# Patient Record
Sex: MALE | Race: NATIVE HAWAIIAN OR OTHER PACIFIC ISLANDER | NOT HISPANIC OR LATINO | Employment: FULL TIME | URBAN - NONMETROPOLITAN AREA
[De-identification: names, ages, dates, MRNs, and addresses within clinical notes are randomized per-mention and may not be internally consistent; named-entity substitution may affect disease eponyms.]

---

## 2017-03-10 ENCOUNTER — OFFICE VISIT (OUTPATIENT)
Dept: URGENT CARE | Facility: PHYSICIAN GROUP | Age: 42
End: 2017-03-10
Payer: COMMERCIAL

## 2017-03-10 VITALS
RESPIRATION RATE: 16 BRPM | TEMPERATURE: 98.4 F | BODY MASS INDEX: 39.25 KG/M2 | SYSTOLIC BLOOD PRESSURE: 120 MMHG | WEIGHT: 259 LBS | HEIGHT: 68 IN | OXYGEN SATURATION: 96 % | HEART RATE: 92 BPM | DIASTOLIC BLOOD PRESSURE: 76 MMHG

## 2017-03-10 DIAGNOSIS — R05.9 COUGH: ICD-10-CM

## 2017-03-10 DIAGNOSIS — E03.8 OTHER SPECIFIED HYPOTHYROIDISM: ICD-10-CM

## 2017-03-10 PROCEDURE — 99214 OFFICE O/P EST MOD 30 MIN: CPT | Performed by: FAMILY MEDICINE

## 2017-03-10 RX ORDER — DOXYCYCLINE HYCLATE 100 MG
100 TABLET ORAL 2 TIMES DAILY
Qty: 20 TAB | Refills: 0 | Status: SHIPPED | OUTPATIENT
Start: 2017-03-10 | End: 2017-03-20

## 2017-03-10 RX ORDER — LEVOTHYROXINE SODIUM 0.07 MG/1
75 TABLET ORAL
Qty: 90 TAB | Refills: 0 | Status: SHIPPED | OUTPATIENT
Start: 2017-03-10 | End: 2018-12-21 | Stop reason: SDUPTHER

## 2017-03-10 ASSESSMENT — ENCOUNTER SYMPTOMS
WEIGHT LOSS: 0
MYALGIAS: 1
EYE DISCHARGE: 0
HEADACHES: 0
COUGH: 1
EYE REDNESS: 0

## 2017-03-10 NOTE — MR AVS SNAPSHOT
"        Sammy MOORE Ciatobin   3/10/2017 6:35 PM   Office Visit   MRN: [id number]    Department:  [facility]   Dept Phone:  [phone]    Description:  Male : 1975   Provider:  Preston Montalvo M.D.           Reason for Visit     Cough chest congestion X 2 weeks, worse in the last week      Allergies as of 3/10/2017     No Known Allergies      You were diagnosed with     Cough   [786.2.ICD-9-CM]       Other specified hypothyroidism   [2471693]         Vital Signs     Blood Pressure Pulse Temperature Respirations Height Weight    120/76 mmHg 92 36.9 °C (98.4 °F) 16 1.715 m (5' 7.5\") 117.482 kg (259 lb)    Body Mass Index Oxygen Saturation Smoking Status             39.94 kg/m2 96% Former Smoker         Basic Information     Date Of Birth Sex Race Ethnicity Preferred Language    1975 Male  Unknown English      Problem List              ICD-10-CM Priority Class Noted - Resolved    History of Hodgkin's disease Z85.71   2014 - Present    Hypothyroidism E03.9   2014 - Present    Fatigue R53.83   2014 - Present    Vitamin D deficiency disease E55.9   2014 - Present    Hypertriglyceridemia E78.1   2014 - Present    Hypersomnolence G47.10   2015 - Present    Pain of left heel M79.672   2015 - Present    Left ear pain H92.02   2015 - Present      Health Maintenance        Date Due Completion Dates    IMM DTaP/Tdap/Td Vaccine (1 - Tdap) 1996 ---    IMM INFLUENZA (1) 2016 ---            Current Immunizations     No immunizations on file.      Below and/or attached are the medications your provider expects you to take. Review all of your home medications and newly ordered medications with your provider and/or pharmacist. Follow medication instructions as directed by your provider and/or pharmacist. Please keep your medication list with you and share with your provider. Update the information when medications are discontinued, doses are changed, or new medications " (including over-the-counter products) are added; and carry medication information at all times in the event of emergency situations     Allergies:  No Known Allergies          Medications  Valid as of: March 10, 2017 -  7:05 PM    Generic Name Brand Name Tablet Size Instructions for use    Doxycycline Hyclate (Tab) VIBRAMYCIN 100 MG Take 1 Tab by mouth 2 times a day for 10 days.        Ergocalciferol (Cap) DRISDOL 66123 UNIT Take 1 Cap by mouth every 7 days.        Ergocalciferol (Cap) DRISDOL 22279 UNIT Take 1 Cap by mouth every 7 days.        Hydrocod Polst-Chlorphen Polst (Suspension Extended Release) TUSSIONEX 10-8 MG/5ML Take 5 mL by mouth every 12 hours.        Levothyroxine Sodium (Tab) SYNTHROID 75 MCG Take 1 Tab by mouth every day.        Levothyroxine Sodium (Tab) SYNTHROID 75 MCG Take 1 Tab by mouth Every morning on an empty stomach.        .                 Medicines prescribed today were sent to:     [facility] [address] [place] [address] [address] AT [place] [address] [address]    [address] [address] [facility] [address]    Phone: [phone] Fax: [phone]    Open 24 Hours?: No      Medication refill instructions:       If your prescription bottle indicates you have medication refills left, it is not necessary to call your provider’s office. Please contact your pharmacy and they will refill your medication.    If your prescription bottle indicates you do not have any refills left, you may request refills at any time through one of the following ways: The online Textbroker system (except Urgent Care), by calling your provider’s office, or by asking your pharmacy to contact your provider’s office with a refill request. Medication refills are processed only during regular business hours and may not be available until the next business day. Your provider may request additional information or to have a follow-up visit with you prior to refilling your medication.   *Please Note:  Medication refills are assigned a new Rx number when refilled electronically. Your pharmacy may indicate that no refills were authorized even though a new prescription for the same medication is available at the pharmacy. Please request the medicine by name with the pharmacy before contacting your provider for a refill.           DigiSynd Access Code: [email or web address]  Expires: 4/9/2017  7:05 PM    DigiSynd  A secure, online tool to manage your health information     [employer]’s DigiSynd® is a secure, online tool that connects you to your personalized health information from the privacy of your home -- day or night - making it very easy for you to manage your healthcare. Once the activation process is completed, you can even access your medical information using the DigiSynd raymundo, which is available for free in the Apple Raymundo store or Google Play store.     DigiSynd provides the following levels of access (as shown below):   My Chart Features   [facility] Primary Care Doctor [facility]  Specialists [facility]  Urgent  Care Non-[facility]  Primary Care  Doctor   Email your healthcare team securely and privately 24/7 X X X    Manage appointments: schedule your next appointment; view details of past/upcoming appointments X      Request prescription refills. X      View recent personal medical records, including lab and immunizations X X X X   View health record, including health history, allergies, medications X X X X   Read reports about your outpatient visits, procedures, consult and ER notes X X X X   See your discharge summary, which is a recap of your hospital and/or ER visit that includes your diagnosis, lab results, and care plan. X X       How to register for DigiSynd:  1. Go to  [email or web address].  2. Click on the Sign Up Now box, which takes you to the New Member Sign Up page. You will need to provide the following information:  a. Enter your DigiSynd Access Code exactly as it appears at the top of this page. (You will  not need to use this code after you’ve completed the sign-up process. If you do not sign up before the expiration date, you must request a new code.)   b. Enter your date of birth.   c. Enter your home email address.   d. Click Submit, and follow the next screen’s instructions.  3. Create a Mind on Gamest ID. This will be your Mind on Gamest login ID and cannot be changed, so think of one that is secure and easy to remember.  4. Create a [employer] password. You can change your password at any time.  5. Enter your Password Reset Question and Answer. This can be used at a later time if you forget your password.   6. Enter your e-mail address. This allows you to receive e-mail notifications when new information is available in [employer].  7. Click Sign Up. You can now view your health information.    For assistance activating your [employer] account, call [phone]

## 2017-03-11 NOTE — PROGRESS NOTES
"Subjective:      Sammy May is a 41 y.o. male who presents with Cough            Cough  This is a new problem. Episode onset: 2 weeks productive without blood in sputum. No fever. Initial nasal congestion and drainage resolved. No SOB/wheeze. No PMH asthma. PMH pneumonia as child. OTC nyquil and delsym with min relief.  Associated symptoms include myalgias. Pertinent negatives include no eye redness, headaches, rash or weight loss.   Worse with laying down. No other aggravating or alleviating factors.     #2   Requests refill synthroid. Has been stable on current dose.    Review of Systems   Constitutional: Positive for malaise/fatigue. Negative for weight loss.   Eyes: Negative for discharge and redness.   Respiratory: Positive for cough.    Musculoskeletal: Positive for myalgias. Negative for joint pain.   Skin: Negative for itching and rash.   Neurological: Negative for headaches.     .  Medications, Allergies, and current problem list reviewed today in Epic       Objective:     /76 mmHg  Pulse 92  Temp(Src) 36.9 °C (98.4 °F)  Resp 16  Ht 1.715 m (5' 7.5\")  Wt 117.482 kg (259 lb)  BMI 39.94 kg/m2  SpO2 96%     Physical Exam   Constitutional: He is oriented to person, place, and time. He appears well-developed and well-nourished. No distress.   HENT:   Head: Normocephalic and atraumatic.   Right Ear: External ear normal.   Left Ear: External ear normal.   Eyes: Conjunctivae are normal.   Neck: Neck supple.   Cardiovascular: Normal rate, regular rhythm and normal heart sounds.    Pulmonary/Chest: Effort normal and breath sounds normal.   Lymphadenopathy:     He has no cervical adenopathy.   Neurological: He is alert and oriented to person, place, and time.   Skin: Skin is warm and dry. No rash noted.               Assessment/Plan:     1. Cough  doxycycline (VIBRAMYCIN) 100 MG Tab    Hydrocod Polst-CPM Polst ER (TUSSIONEX) 10-8 MG/5ML Suspension Extended Release   2. Other specified hypothyroidism  " levothyroxine (SYNTHROID) 75 MCG Tab     Differential diagnosis, natural history, supportive care, and indications for immediate follow-up discussed at length.   Contingent antibiotic prescription given to patient to fill upon meeting criteria of guidelines discussed.

## 2018-02-14 ENCOUNTER — APPOINTMENT (OUTPATIENT)
Dept: RADIOLOGY | Facility: MEDICAL CENTER | Age: 43
End: 2018-02-14
Attending: EMERGENCY MEDICINE
Payer: COMMERCIAL

## 2018-02-14 ENCOUNTER — HOSPITAL ENCOUNTER (EMERGENCY)
Facility: MEDICAL CENTER | Age: 43
End: 2018-02-14
Attending: EMERGENCY MEDICINE
Payer: COMMERCIAL

## 2018-02-14 VITALS
BODY MASS INDEX: 36.7 KG/M2 | TEMPERATURE: 98.3 F | HEART RATE: 74 BPM | OXYGEN SATURATION: 96 % | HEIGHT: 69 IN | RESPIRATION RATE: 16 BRPM | DIASTOLIC BLOOD PRESSURE: 70 MMHG | SYSTOLIC BLOOD PRESSURE: 118 MMHG | WEIGHT: 247.8 LBS

## 2018-02-14 DIAGNOSIS — H65.03 BILATERAL ACUTE SEROUS OTITIS MEDIA, RECURRENCE NOT SPECIFIED: ICD-10-CM

## 2018-02-14 DIAGNOSIS — J40 BRONCHITIS: ICD-10-CM

## 2018-02-14 DIAGNOSIS — J02.9 PHARYNGITIS, UNSPECIFIED ETIOLOGY: ICD-10-CM

## 2018-02-14 LAB
FLUAV RNA SPEC QL NAA+PROBE: NEGATIVE
FLUBV RNA SPEC QL NAA+PROBE: NEGATIVE
S PYO AG THROAT QL: NORMAL
SIGNIFICANT IND 70042: NORMAL
SITE SITE: NORMAL
SOURCE SOURCE: NORMAL

## 2018-02-14 PROCEDURE — 87502 INFLUENZA DNA AMP PROBE: CPT

## 2018-02-14 PROCEDURE — 87081 CULTURE SCREEN ONLY: CPT

## 2018-02-14 PROCEDURE — 99284 EMERGENCY DEPT VISIT MOD MDM: CPT

## 2018-02-14 PROCEDURE — 87880 STREP A ASSAY W/OPTIC: CPT

## 2018-02-14 PROCEDURE — 71046 X-RAY EXAM CHEST 2 VIEWS: CPT

## 2018-02-14 PROCEDURE — A9270 NON-COVERED ITEM OR SERVICE: HCPCS | Performed by: EMERGENCY MEDICINE

## 2018-02-14 PROCEDURE — 700102 HCHG RX REV CODE 250 W/ 637 OVERRIDE(OP): Performed by: EMERGENCY MEDICINE

## 2018-02-14 RX ORDER — AMOXICILLIN AND CLAVULANATE POTASSIUM 875; 125 MG/1; MG/1
1 TABLET, FILM COATED ORAL 2 TIMES DAILY
Qty: 20 TAB | Refills: 0 | Status: SHIPPED | OUTPATIENT
Start: 2018-02-14 | End: 2018-12-21

## 2018-02-14 RX ORDER — LEVOTHYROXINE SODIUM 0.05 MG/1
50 TABLET ORAL
Qty: 30 TAB | Refills: 1 | Status: SHIPPED | OUTPATIENT
Start: 2018-02-14 | End: 2018-12-21

## 2018-02-14 RX ORDER — PROMETHAZINE HYDROCHLORIDE AND CODEINE PHOSPHATE 6.25; 1 MG/5ML; MG/5ML
5-10 SYRUP ORAL 4 TIMES DAILY PRN
Qty: 300 ML | Refills: 0 | Status: SHIPPED | OUTPATIENT
Start: 2018-02-14 | End: 2018-02-21

## 2018-02-14 RX ORDER — AMOXICILLIN AND CLAVULANATE POTASSIUM 875; 125 MG/1; MG/1
1 TABLET, FILM COATED ORAL ONCE
Status: COMPLETED | OUTPATIENT
Start: 2018-02-14 | End: 2018-02-14

## 2018-02-14 RX ADMIN — AMOXICILLIN AND CLAVULANATE POTASSIUM 1 TABLET: 875; 125 TABLET, FILM COATED ORAL at 18:15

## 2018-02-14 ASSESSMENT — LIFESTYLE VARIABLES: DO YOU DRINK ALCOHOL: YES

## 2018-02-14 NOTE — ED TRIAGE NOTES
Chief Complaint   Patient presents with   • Congestion     productive cough greenish/mucus   • Nausea

## 2018-02-15 NOTE — ED TRIAGE NOTES
.  Chief Complaint   Patient presents with   • Congestion     productive cough greenish/mucus   • Nausea   Onset X 3 days.  No fever/chills. No chest pain.  Pt reports children are also sick.

## 2018-02-15 NOTE — ED PROVIDER NOTES
"ED Provider Note    CHIEF COMPLAINT  Chief Complaint   Patient presents with   • Congestion     productive cough greenish/mucus   • Nausea       HPI  Sammy May is a 42 y.o. male who presents to emergency today with complaints of productive cough of green phlegm for the past 2 days, sore throat, congestion. No vomiting has slight diarrhea times no fever chills chest pain or shortness of breath. He states his kids have been sick with similar symptoms.    REVIEW OF SYSTEMS  See HPI for further details. All other systems are negative.      PAST MEDICAL HISTORY  Past Medical History:   Diagnosis Date   • ED (erectile dysfunction)    • Hodgkin disease (CMS-HCC)     age 21, treated at [facility]   • Hypothyroidism        FAMILY HISTORY  Family History   Problem Relation Age of Onset   • Other Father      etoh   • Cancer Paternal Grandmother      lung   • Stroke Paternal Grandfather        SOCIAL HISTORY  Social History     Social History   • Marital status: Unknown     Spouse name: N/A   • Number of children: N/A   • Years of education: N/A     Social History Main Topics   • Smoking status: Former Smoker     Packs/day: 1.50     Years: 7.00     Types: Cigarettes     Quit date: 5/8/2011   • Smokeless tobacco: Never Used   • Alcohol use No   • Drug use: No      Comment: hx drug use, no IV; mostly MJ   • Sexual activity: Yes     Partners: Female     Other Topics Concern   • Not on file     Social History Narrative   • No narrative on file       SURGICAL HISTORY  No past surgical history on file.    CURRENT MEDICATIONS  Home Medications    **Home medications have not yet been reviewed for this encounter**         ALLERGIES  No Known Allergies    PHYSICAL EXAM  VITAL SIGNS: /65   Pulse 77   Temp 36.3 °C (97.3 °F)   Resp 18   Ht 1.753 m (5' 9\")   Wt 112.4 kg (247 lb 12.8 oz)   SpO2 96%   BMI 36.59 kg/m²  Room air O2: 96    Constitutional :  Well developed, Well nourished, No acute distress, Non-toxic appearance. "   HENT: Pharynx and no injection TMs are erythematous left greater than right.   Eyes: perrla  Neck: Normal range of motion, No tenderness, Supple, No stridor.   Lymphatic: Left submandibular lymphadenopathy.   Cardiovascular: Normal heart rate, Normal rhythm, No murmurs, No rubs, No gallops.   Thorax & Lungs: Clear to auscultation all fields  Skin: Warm, Dry, No erythema, No rash.   Extremities: Intact distal pulses, No edema, No tenderness, No cyanosis, No clubbing.       RADIOLOGY/PROCEDURES  DX-CHEST-2 VIEWS   Final Result      No active disease.            COURSE & MEDICAL DECISION MAKING  Pertinent Labs & Imaging studies reviewed. (See chart for details)  Patient has otitis bilaterally placed on Augmentin. His rapid strep was negative influenza negative chest x-ray shows no acute process. He bronchitis. He is placed on Phenergan with codeine cough syrup. Controlled substance regulations through [facility] were reviewed with patient. He is not to sell the medication, give to others, he is to keep it in a safe spot away from children. Dispose of it properly. Try alternatives 1st like NyQuil ,OTC cough syrup, not to drive or operate heavy machinery medication.'s he verbalized understanding of these instructions follow up with his primary care physician given referral to Dr. Sarabia. Consent forms signed. [facility] reviewed. 's history of thyroid disease and had requested refill of his medication which was given. Synthroid 50 µg.    FINAL IMPRESSION  1. Acute pharyngitis  2. Bronchitis/bilateral otitis media  3.      Electronically signed by: Mark Sykes, 2/14/2018

## 2018-02-15 NOTE — DISCHARGE INSTRUCTIONS
Bronchitis  Bronchitis is a problem of the air tubes leading to your lungs. This problem makes it hard for air to get in and out of the lungs. You may cough a lot because your air tubes are narrow. Going without care can cause lasting (chronic) bronchitis.  HOME CARE   · Drink enough fluids to keep your pee (urine) clear or pale yellow.  · Use a cool mist humidifier.  · Quit smoking if you smoke. If you keep smoking, the bronchitis might not get better.  · Only take medicine as told by your doctor.  GET HELP RIGHT AWAY IF:   · Coughing keeps you awake.  · You start to wheeze.  · You become more sick or weak.  · You have a hard time breathing or get short of breath.  · You cough up blood.  · Coughing lasts more than 2 weeks.  · You have a fever.  · Your baby is older than 3 months with a rectal temperature of 102° F (38.9° C) or higher.  · Your baby is 3 months old or younger with a rectal temperature of 100.4° F (38° C) or higher.  MAKE SURE YOU:  · Understand these instructions.  · Will watch your condition.  · Will get help right away if you are not doing well or get worse.  Document Released: 06/05/2009 Document Revised: 03/11/2013 Document Reviewed: 11/19/2010  [employer]® Patient Information ©2014 [employer].      Pharyngitis  Pharyngitis is a sore throat (pharynx). There is redness, pain, and swelling of your throat.  HOME CARE   · Drink enough fluids to keep your pee (urine) clear or pale yellow.  · Only take medicine as told by your doctor.  ¨ You may get sick again if you do not take medicine as told. Finish your medicines, even if you start to feel better.  ¨ Do not take aspirin.  · Rest.  · Rinse your mouth (gargle) with salt water (½ tsp of salt per 1 qt of water) every 1-2 hours. This will help the pain.  · If you are not at risk for choking, you can suck on hard candy or sore throat lozenges.  GET HELP IF:  · You have large, tender lumps on your neck.  · You have a rash.  · You cough up green,  yellow-brown, or bloody spit.  GET HELP RIGHT AWAY IF:   · You have a stiff neck.  · You drool or cannot swallow liquids.  · You throw up (vomit) or are not able to keep medicine or liquids down.  · You have very bad pain that does not go away with medicine.  · You have problems breathing (not from a stuffy nose).  MAKE SURE YOU:   · Understand these instructions.  · Will watch your condition.  · Will get help right away if you are not doing well or get worse.     This information is not intended to replace advice given to you by your health care provider. Make sure you discuss any questions you have with your health care provider.     Document Released: 06/05/2009 Document Revised: 10/08/2014 Document Reviewed: 08/25/2014  Elsevier Interactive Patient Education ©2016 Elsevier Inc.

## 2018-02-16 LAB
S PYO SPEC QL CULT: NORMAL
SIGNIFICANT IND 70042: NORMAL
SITE SITE: NORMAL
SOURCE SOURCE: NORMAL

## 2018-10-18 ENCOUNTER — OFFICE VISIT (OUTPATIENT)
Dept: MEDICAL GROUP | Facility: PHYSICIAN GROUP | Age: 43
End: 2018-10-18
Payer: COMMERCIAL

## 2018-10-18 VITALS
BODY MASS INDEX: 35.4 KG/M2 | RESPIRATION RATE: 96 BRPM | HEIGHT: 69 IN | DIASTOLIC BLOOD PRESSURE: 64 MMHG | WEIGHT: 239 LBS | HEART RATE: 92 BPM | SYSTOLIC BLOOD PRESSURE: 122 MMHG | TEMPERATURE: 97.5 F

## 2018-10-18 DIAGNOSIS — Z23 NEED FOR VACCINATION: ICD-10-CM

## 2018-10-18 DIAGNOSIS — R53.83 OTHER FATIGUE: ICD-10-CM

## 2018-10-18 DIAGNOSIS — E78.1 HYPERTRIGLYCERIDEMIA: ICD-10-CM

## 2018-10-18 DIAGNOSIS — E03.8 OTHER SPECIFIED HYPOTHYROIDISM: ICD-10-CM

## 2018-10-18 DIAGNOSIS — Z72.51 HIGH RISK HETEROSEXUAL BEHAVIOR: ICD-10-CM

## 2018-10-18 DIAGNOSIS — N52.1 ERECTILE DYSFUNCTION DUE TO DISEASES CLASSIFIED ELSEWHERE: ICD-10-CM

## 2018-10-18 DIAGNOSIS — Z85.71 HISTORY OF HODGKIN'S DISEASE: ICD-10-CM

## 2018-10-18 PROBLEM — N52.9 ERECTILE DYSFUNCTION: Status: ACTIVE | Noted: 2018-10-18

## 2018-10-18 PROCEDURE — 90471 IMMUNIZATION ADMIN: CPT | Performed by: FAMILY MEDICINE

## 2018-10-18 PROCEDURE — 90686 IIV4 VACC NO PRSV 0.5 ML IM: CPT | Performed by: FAMILY MEDICINE

## 2018-10-18 PROCEDURE — 99204 OFFICE O/P NEW MOD 45 MIN: CPT | Mod: 25 | Performed by: FAMILY MEDICINE

## 2018-10-18 RX ORDER — LEVOTHYROXINE SODIUM 0.07 MG/1
75 TABLET ORAL
Qty: 30 TAB | Refills: 11 | Status: SHIPPED | OUTPATIENT
Start: 2018-10-18 | End: 2018-12-21

## 2018-10-18 ASSESSMENT — PATIENT HEALTH QUESTIONNAIRE - PHQ9: CLINICAL INTERPRETATION OF PHQ2 SCORE: 0

## 2018-10-18 ASSESSMENT — PAIN SCALES - GENERAL: PAINLEVEL: NO PAIN

## 2018-10-18 NOTE — ASSESSMENT & PLAN NOTE
Chronic uncontrolled medical condition.  States that he cannot achieve a full erection and will often lose his erect status in the middle of colitis.  He states that this was better when he was treated for his hypothyroidism.

## 2018-10-18 NOTE — ASSESSMENT & PLAN NOTE
Chronic unstable medical condition.  Patient has noticed fatigue and weight gain and has been off of his levothyroxine for 5 months now.  Prior to his 5-month hiatus from the levothyroxine he was noncompliant with his medications.  And frequently forgot his dose.

## 2018-10-18 NOTE — PROGRESS NOTES
CC:  Diagnoses of Other specified hypothyroidism, Other fatigue, Hypertriglyceridemia, Need for vaccination, High risk heterosexual behavior, Erectile dysfunction due to diseases classified elsewhere, and History of Hodgkin's disease were pertinent to this visit.    HISTORY OF THE PRESENT ILLNESS: Patient is a 43 y.o. male. This pleasant patient is here today to establish with a new primary care provider.  Last seen by Liliya gamboa in February 2015 and one urgent care visit in 2017 for a cough.    Health Maintenance: Completed      Hypothyroidism  Chronic unstable medical condition.  Patient has noticed fatigue and weight gain and has been off of his levothyroxine for 5 months now.  Prior to his 5-month hiatus from the levothyroxine he was noncompliant with his medications.  And frequently forgot his dose.    Fatigue  Likely secondary to his untreated hypothyroidism.  We will reevaluate in 2 months after he has restarted his medications and we retested his TSH level.    Erectile dysfunction  Chronic uncontrolled medical condition.  States that he cannot achieve a full erection and will often lose his erect status in the middle of colitis.  He states that this was better when he was treated for his hypothyroidism.    High risk heterosexual behavior  Long history of sexual promiscuity that is currently better as he is in a monogamous relationship right now but has never been tested for STDs.  States that he has never had any lesions or discharge or pain, but would still like to get checked out.    Hypertriglyceridemia  Chronic and stable medical condition.  Last lab check demonstrated hypertriglyceridemia and low HDL.  We will perform follow-up labs.    History of Hodgkin's disease  Chronic stable medical condition.  Currently in full remission.    Allergies: Patient has no known allergies.    Current Outpatient Prescriptions Ordered in [facility]   Medication Sig Dispense Refill   • levothyroxine (SYNTHROID) 75 MCG Tab  Take 1 Tab by mouth Every morning on an empty stomach. 30 Tab 11   • levothyroxine (SYNTHROID) 75 MCG Tab Take 1 Tab by mouth Every morning on an empty stomach. 90 Tab 0   • levothyroxine (SYNTHROID) 50 MCG Tab Take 1 Tab by mouth Every morning on an empty stomach. 30 Tab 1   • amoxicillin-clavulanate (AUGMENTIN) 875-125 MG Tab Take 1 Tab by mouth 2 times a day. 20 Tab 0   • Hydrocod Polst-CPM Polst ER (TUSSIONEX) 10-8 MG/5ML Suspension Extended Release Take 5 mL by mouth every 12 hours. 120 mL 0   • levothyroxine (SYNTHROID) 75 MCG TABS Take 1 Tab by mouth every day. 90 Tab 3   • ergocalciferol (DRISDOL) 16942 UNIT capsule Take 1 Cap by mouth every 7 days. 12 Cap 0   • ergocalciferol (DRISDOL) 22194 UNIT capsule Take 1 Cap by mouth every 7 days. 12 Cap 0     No current Epic-ordered facility-administered medications on file.        Past Medical History:   Diagnosis Date   • ED (erectile dysfunction)    • Hodgkin disease (HCC)     age 21, treated at [facility]   • Hypothyroidism        Past Surgical History:   Procedure Laterality Date   • LYMPH NODE EXCISION Right 1997   • DENTAL EXTRACTION(S)      wisdom teeth       Social History   Substance Use Topics   • Smoking status: Former Smoker     Packs/day: 1.50     Years: 7.00     Types: Cigarettes     Quit date: 5/8/2011   • Smokeless tobacco: Never Used   • Alcohol use No       Social History     Social History Narrative   • No narrative on file       Family History   Problem Relation Age of Onset   • Other Father         etoh   • Alcohol/Drug Father    • Diabetes Father    • Lung Disease Father         smoker on O2   • Cancer Mother    • Cancer Paternal Grandmother         lung   • Stroke Paternal Grandfather        ROS:   Constitutional: No Fevers, Chills, positive weight gain  Eyes: No eye pain  ENT: No sore throat  Resp: No Shortness of breath  CV: No Chest pain  GI: No Nausea/Vomiting  MSK: No weakness  Skin: No rashes  Neuro: No Headaches  Psych: No Suicidal  "ideations        Exam: Blood pressure 122/64, pulse 92, temperature 36.4 °C (97.5 °F), resp. rate (!) 96, height 1.753 m (5' 9\"), weight 108.4 kg (239 lb). Body mass index is 35.29 kg/m².    GENERAL: No acute distress  HENT: Atraumatic, normocephalic, external auditory canals clear bilaterally, TMs translucent and pearly gray, nares clear without discharge, posterior pharynx clear without erythema or exudates.  EYES: Extraocular movements intact, pupils equal and reactive to light.  NECK: Supple, FROM  CHEST: No deformities, Equal chest expansion, no murmurs, gallops, or rubs.  RESP: Unlabored, no stridor or audible wheeze.  No wheezes, crackles, nor rhonchi  ABD: Soft, Nontender  Extremities: No Clubbing, Cyanosis, or Edema.  Skin: Warm/dry, without rases  Neuro: A/O x 4, CN 2-12 Grossly intact, Motor/sensory grosly intact  Psych: Normal behavior, normal affect        Lab review:  orders written for new lab studies as appropriate; see orders    Medical Records Reviewed:  Medical records from February 2015 and urgent care visit from March 2017 reviewed      Assessment/Plan:  1. Other specified hypothyroidism  Chronic uncontrolled medical condition.  Patient has been noncompliant with his medicine for 5 months.  Sent new prescription of levothyroxine to his pharmacy and labs as below.  Follow-up in 2 months.  - TSH WITH REFLEX TO FT4; Future  - CBC WITH DIFFERENTIAL; Future  - COMP METABOLIC PANEL; Future  - levothyroxine (SYNTHROID) 75 MCG Tab; Take 1 Tab by mouth Every morning on an empty stomach.  Dispense: 30 Tab; Refill: 11    2. Other fatigue  Chronic uncontrolled medical condition.  Probably related to his hypothyroidism.  Labs as below.  Follow-up in 2 months after treated for his hypothyroidism to see if this improves.  - TSH WITH REFLEX TO FT4; Future  - CBC WITH DIFFERENTIAL; Future  - COMP METABOLIC PANEL; Future    3. Hypertriglyceridemia  Chronic uncontrolled medical condition.  Previous history of " hypertriglyceridemia and low HDL.  Labs as below.  Follow-up in 2 months  - TSH WITH REFLEX TO FT4; Future  - CBC WITH DIFFERENTIAL; Future  - COMP METABOLIC PANEL; Future  - LIPID PROFILE; Future    4. Need for vaccination  - Influenza Vaccine Quad Injection >3Y (PF)    5. High risk heterosexual behavior  History of high promise daily and never been tested currently better now that he is in a monogamous relationship.  Labs as below.  Follow-up in 2 months.  - HIV AG/AB COMBO ASSAY SCREENING; Future  - T.PALLIDUM AB EIA; Future  - CHLAMYDIA/GC PCR URINE OR SWAB; Future    6. Erectile dysfunction due to diseases classified elsewhere  Chronic uncontrolled medical condition.  We will treat for his hypothyroidism and see if this improves.  Reevaluate in 2 months.    7. History of Hodgkin's disease  Chronic controlled medical condition.  Currently in full remission.    Follow-up in 2 months.      Please note that this dictation was created using voice recognition software. I have made every reasonable attempt to correct obvious errors, but I expect that there are errors of grammar and possibly content that I did not discover before finalizing the note.

## 2018-10-18 NOTE — ASSESSMENT & PLAN NOTE
Long history of sexual promiscuity that is currently better as he is in a monogamous relationship right now but has never been tested for STDs.  States that he has never had any lesions or discharge or pain, but would still like to get checked out.

## 2018-10-18 NOTE — ASSESSMENT & PLAN NOTE
Chronic and stable medical condition.  Last lab check demonstrated hypertriglyceridemia and low HDL.  We will perform follow-up labs.

## 2018-10-18 NOTE — ASSESSMENT & PLAN NOTE
Likely secondary to his untreated hypothyroidism.  We will reevaluate in 2 months after he has restarted his medications and we retested his TSH level.

## 2018-12-07 ENCOUNTER — HOSPITAL ENCOUNTER (OUTPATIENT)
Dept: LAB | Facility: MEDICAL CENTER | Age: 43
End: 2018-12-07
Attending: FAMILY MEDICINE
Payer: COMMERCIAL

## 2018-12-07 DIAGNOSIS — E78.1 HYPERTRIGLYCERIDEMIA: ICD-10-CM

## 2018-12-07 DIAGNOSIS — Z72.51 HIGH RISK HETEROSEXUAL BEHAVIOR: ICD-10-CM

## 2018-12-07 DIAGNOSIS — R53.83 OTHER FATIGUE: ICD-10-CM

## 2018-12-07 DIAGNOSIS — E03.8 OTHER SPECIFIED HYPOTHYROIDISM: ICD-10-CM

## 2018-12-07 LAB
ALBUMIN SERPL BCP-MCNC: 4.4 G/DL (ref 3.2–4.9)
ALBUMIN/GLOB SERPL: 1.3 G/DL
ALP SERPL-CCNC: 65 U/L (ref 30–99)
ALT SERPL-CCNC: 36 U/L (ref 2–50)
ANION GAP SERPL CALC-SCNC: 11 MMOL/L (ref 0–11.9)
AST SERPL-CCNC: 29 U/L (ref 12–45)
BASOPHILS # BLD AUTO: 0.9 % (ref 0–1.8)
BASOPHILS # BLD: 0.08 K/UL (ref 0–0.12)
BILIRUB SERPL-MCNC: 0.7 MG/DL (ref 0.1–1.5)
BUN SERPL-MCNC: 15 MG/DL (ref 8–22)
C TRACH DNA SPEC QL NAA+PROBE: NEGATIVE
CALCIUM SERPL-MCNC: 8.9 MG/DL (ref 8.5–10.5)
CHLORIDE SERPL-SCNC: 103 MMOL/L (ref 96–112)
CHOLEST SERPL-MCNC: 226 MG/DL (ref 100–199)
CO2 SERPL-SCNC: 24 MMOL/L (ref 20–33)
CREAT SERPL-MCNC: 0.91 MG/DL (ref 0.5–1.4)
EOSINOPHIL # BLD AUTO: 0.23 K/UL (ref 0–0.51)
EOSINOPHIL NFR BLD: 2.7 % (ref 0–6.9)
ERYTHROCYTE [DISTWIDTH] IN BLOOD BY AUTOMATED COUNT: 40.5 FL (ref 35.9–50)
GLOBULIN SER CALC-MCNC: 3.3 G/DL (ref 1.9–3.5)
GLUCOSE SERPL-MCNC: 108 MG/DL (ref 65–99)
HCT VFR BLD AUTO: 52.5 % (ref 42–52)
HDLC SERPL-MCNC: 32 MG/DL
HGB BLD-MCNC: 17.1 G/DL (ref 14–18)
HIV 1+2 AB+HIV1 P24 AG SERPL QL IA: NON REACTIVE
IMM GRANULOCYTES # BLD AUTO: 0.07 K/UL (ref 0–0.11)
IMM GRANULOCYTES NFR BLD AUTO: 0.8 % (ref 0–0.9)
LDLC SERPL CALC-MCNC: ABNORMAL MG/DL
LYMPHOCYTES # BLD AUTO: 1.26 K/UL (ref 1–4.8)
LYMPHOCYTES NFR BLD: 14.9 % (ref 22–41)
MCH RBC QN AUTO: 28.4 PG (ref 27–33)
MCHC RBC AUTO-ENTMCNC: 32.6 G/DL (ref 33.7–35.3)
MCV RBC AUTO: 87.2 FL (ref 81.4–97.8)
MONOCYTES # BLD AUTO: 0.66 K/UL (ref 0–0.85)
MONOCYTES NFR BLD AUTO: 7.8 % (ref 0–13.4)
N GONORRHOEA DNA SPEC QL NAA+PROBE: NEGATIVE
NEUTROPHILS # BLD AUTO: 6.18 K/UL (ref 1.82–7.42)
NEUTROPHILS NFR BLD: 72.9 % (ref 44–72)
NRBC # BLD AUTO: 0 K/UL
NRBC BLD-RTO: 0 /100 WBC
PLATELET # BLD AUTO: 249 K/UL (ref 164–446)
PMV BLD AUTO: 11.4 FL (ref 9–12.9)
POTASSIUM SERPL-SCNC: 3.7 MMOL/L (ref 3.6–5.5)
PROT SERPL-MCNC: 7.7 G/DL (ref 6–8.2)
RBC # BLD AUTO: 6.02 M/UL (ref 4.7–6.1)
SODIUM SERPL-SCNC: 138 MMOL/L (ref 135–145)
SPECIMEN SOURCE: NORMAL
TREPONEMA PALLIDUM IGG+IGM AB [PRESENCE] IN SERUM OR PLASMA BY IMMUNOASSAY: NON REACTIVE
TRIGL SERPL-MCNC: 424 MG/DL (ref 0–149)
TSH SERPL DL<=0.005 MIU/L-ACNC: 2.29 UIU/ML (ref 0.38–5.33)
WBC # BLD AUTO: 8.5 K/UL (ref 4.8–10.8)

## 2018-12-07 PROCEDURE — 87389 HIV-1 AG W/HIV-1&-2 AB AG IA: CPT

## 2018-12-07 PROCEDURE — 80061 LIPID PANEL: CPT

## 2018-12-07 PROCEDURE — 87591 N.GONORRHOEAE DNA AMP PROB: CPT

## 2018-12-07 PROCEDURE — 87491 CHLMYD TRACH DNA AMP PROBE: CPT

## 2018-12-07 PROCEDURE — 85025 COMPLETE CBC W/AUTO DIFF WBC: CPT

## 2018-12-07 PROCEDURE — 84443 ASSAY THYROID STIM HORMONE: CPT

## 2018-12-07 PROCEDURE — 86780 TREPONEMA PALLIDUM: CPT

## 2018-12-07 PROCEDURE — 36415 COLL VENOUS BLD VENIPUNCTURE: CPT

## 2018-12-07 PROCEDURE — 80053 COMPREHEN METABOLIC PANEL: CPT

## 2018-12-10 ENCOUNTER — TELEPHONE (OUTPATIENT)
Dept: MEDICAL GROUP | Facility: PHYSICIAN GROUP | Age: 43
End: 2018-12-10

## 2018-12-10 NOTE — TELEPHONE ENCOUNTER
----- Message from Juan A Lamb M.D. sent at 12/10/2018  1:46 PM PST -----  Please advise patient that lab results were normal with the exception of his cholesterol panel.  We can discuss treatment options at his follow-up appointment on 12/21/2018.

## 2018-12-10 NOTE — TELEPHONE ENCOUNTER
Phone Number Called: [phone] (home)       Message: let pt know Dr will discuss lab results at his upcoming appt    Left Message for patient to call back: no

## 2018-12-21 ENCOUNTER — OFFICE VISIT (OUTPATIENT)
Dept: MEDICAL GROUP | Facility: PHYSICIAN GROUP | Age: 43
End: 2018-12-21
Payer: COMMERCIAL

## 2018-12-21 VITALS
SYSTOLIC BLOOD PRESSURE: 120 MMHG | HEART RATE: 81 BPM | DIASTOLIC BLOOD PRESSURE: 82 MMHG | BODY MASS INDEX: 35.25 KG/M2 | TEMPERATURE: 98.5 F | HEIGHT: 69 IN | OXYGEN SATURATION: 96 % | WEIGHT: 238 LBS

## 2018-12-21 DIAGNOSIS — E03.8 OTHER SPECIFIED HYPOTHYROIDISM: ICD-10-CM

## 2018-12-21 DIAGNOSIS — N52.1 ERECTILE DYSFUNCTION DUE TO DISEASES CLASSIFIED ELSEWHERE: ICD-10-CM

## 2018-12-21 DIAGNOSIS — E78.1 HYPERTRIGLYCERIDEMIA: ICD-10-CM

## 2018-12-21 PROCEDURE — 99214 OFFICE O/P EST MOD 30 MIN: CPT | Performed by: FAMILY MEDICINE

## 2018-12-21 RX ORDER — LEVOTHYROXINE SODIUM 0.07 MG/1
75 TABLET ORAL
Qty: 90 TAB | Refills: 3 | Status: SHIPPED | OUTPATIENT
Start: 2018-12-21 | End: 2019-12-03 | Stop reason: SDUPTHER

## 2018-12-21 RX ORDER — ATORVASTATIN CALCIUM 20 MG/1
20 TABLET, FILM COATED ORAL DAILY
Qty: 90 TAB | Refills: 3 | Status: SHIPPED | OUTPATIENT
Start: 2018-12-21 | End: 2020-03-06 | Stop reason: SDUPTHER

## 2018-12-21 RX ORDER — SILDENAFIL 50 MG/1
50 TABLET, FILM COATED ORAL PRN
Qty: 30 TAB | Refills: 3 | Status: SHIPPED | OUTPATIENT
Start: 2018-12-21

## 2018-12-21 ASSESSMENT — PAIN SCALES - GENERAL: PAINLEVEL: NO PAIN

## 2018-12-21 NOTE — ASSESSMENT & PLAN NOTE
Chronic ongoing medical condition.  Patient with elevated triglycerides in the 400s.  Strong family history of stroke and heart disease.  ASCVD score 2.7%, however I do not feel that this accurately reflects his cardiac risk due to the fact that it does not take into account his family history nor his triglycerides.  No heart attack or stroke to date.

## 2018-12-21 NOTE — ASSESSMENT & PLAN NOTE
Chronic ongoing medical condition.  Possibly secondary to physiologic comorbidities such as hypertriglyceridemia and hypothyroidism.  Hypothyroidism currently well controlled on 75 mcg daily.

## 2018-12-21 NOTE — PROGRESS NOTES
CC:Diagnoses of Hypertriglyceridemia, Erectile dysfunction due to diseases classified elsewhere, and Other specified hypothyroidism were pertinent to this visit.      HISTORY OF PRESENT ILLNESS: Patient is a 43 y.o. male established patient who presents today to follow-up on triglycerides, hypothyroidism, and erectile dysfunction.    Health Maintenance: Completed    Erectile dysfunction  Chronic ongoing medical condition.  Possibly secondary to physiologic comorbidities such as hypertriglyceridemia and hypothyroidism.  Hypothyroidism currently well controlled on 75 mcg daily.    Hypertriglyceridemia  Chronic ongoing medical condition.  Patient with elevated triglycerides in the 400s.  Strong family history of stroke and heart disease.  ASCVD score 2.7%, however I do not feel that this accurately reflects his cardiac risk due to the fact that it does not take into account his family history nor his triglycerides.  No heart attack or stroke to date.    Hypothyroidism  Chronic stable medical condition.  Currently well controlled on levothyroxine 75 mcg daily.      Patient Active Problem List    Diagnosis Date Noted   • High risk heterosexual behavior 10/18/2018   • Erectile dysfunction 10/18/2018   • Hypersomnolence 02/17/2015   • Hypertriglyceridemia 06/17/2014   • Vitamin D deficiency disease 06/16/2014   • History of Hodgkin's disease 05/08/2014   • Hypothyroidism 05/08/2014   • Fatigue 05/08/2014      Allergies:Patient has no known allergies.    Current Outpatient Prescriptions   Medication Sig Dispense Refill   • atorvastatin (LIPITOR) 20 MG Tab Take 1 Tab by mouth every day. 90 Tab 3   • levothyroxine (SYNTHROID) 75 MCG Tab Take 1 Tab by mouth Every morning on an empty stomach. 90 Tab 3   • sildenafil citrate (VIAGRA) 50 MG tablet Take 1 Tab by mouth as needed for Erectile Dysfunction. 30 Tab 3     No current facility-administered medications for this visit.        Social History   Substance Use Topics   •  "Smoking status: Former Smoker     Packs/day: 1.50     Years: 7.00     Types: Cigarettes     Quit date: 5/8/2011   • Smokeless tobacco: Never Used   • Alcohol use No     Social History     Social History Narrative   • No narrative on file       Family History   Problem Relation Age of Onset   • Other Father         etoh   • Alcohol/Drug Father    • Diabetes Father    • Lung Disease Father         smoker on O2   • Cancer Mother    • Cancer Paternal Grandmother         lung   • Stroke Paternal Grandfather        Review of Systems:    Denies chest pain and shortness of breath    Exam:    Blood pressure 120/82, pulse 81, temperature 36.9 °C (98.5 °F), height 1.753 m (5' 9\"), weight 108 kg (238 lb), SpO2 96 %. Body mass index is 35.15 kg/m².    GENERAL: No acute distress  HENT: Atraumatic, normocephalic  EYES: Extraocular movements intact, pupils equal and reactive to light  NECK: Supple, FROM  CHEST: No deformities, Equal chest expansion  RESP: Unlabored, no stridor or audible wheeze  ABD: Soft, Nontender, Non-Distended  Extremities: No Clubbing, Cyanosis, or Edema  Skin: Warm/dry, without rases  Neuro: A/O x 4, CN 2-12 Grossly intact, Motor/sensory grosly intact  Psych: Normal behavior, normal affect    LABS: Cholesterol and thyroid panel: Results reviewed and discussed with the patient, questions answered.    Assessment/Plan:  1. Hypertriglyceridemia  Chronic ongoing medical condition. New prescription for atorvastatin as below.  Labs as below for 2-3 months from now.  Follow-up pending labs.  - atorvastatin (LIPITOR) 20 MG Tab; Take 1 Tab by mouth every day.  Dispense: 90 Tab; Refill: 3  - COMP METABOLIC PANEL; Future  - Lipid Profile; Future    2. Erectile dysfunction due to diseases classified elsewhere  Chronic ongoing medical condition.  Viagra as below.  - sildenafil citrate (VIAGRA) 50 MG tablet; Take 1 Tab by mouth as needed for Erectile Dysfunction.  Dispense: 30 Tab; Refill: 3    3. Other specified " hypothyroidism  Chronic stable medical condition.  Please continue levothyroxine at 75 mcg daily.  - levothyroxine (SYNTHROID) 75 MCG Tab; Take 1 Tab by mouth Every morning on an empty stomach.  Dispense: 90 Tab; Refill: 3    Follow-up pending labs or in 1 year.

## 2019-07-12 ENCOUNTER — OFFICE VISIT (OUTPATIENT)
Dept: MEDICAL GROUP | Facility: PHYSICIAN GROUP | Age: 44
End: 2019-07-12
Payer: COMMERCIAL

## 2019-07-12 VITALS
HEART RATE: 91 BPM | WEIGHT: 244 LBS | SYSTOLIC BLOOD PRESSURE: 122 MMHG | TEMPERATURE: 98.4 F | HEIGHT: 69 IN | BODY MASS INDEX: 36.14 KG/M2 | OXYGEN SATURATION: 95 % | DIASTOLIC BLOOD PRESSURE: 80 MMHG

## 2019-07-12 DIAGNOSIS — E03.8 OTHER SPECIFIED HYPOTHYROIDISM: ICD-10-CM

## 2019-07-12 DIAGNOSIS — E66.9 OBESITY (BMI 30-39.9): ICD-10-CM

## 2019-07-12 DIAGNOSIS — Z23 NEED FOR VACCINATION: ICD-10-CM

## 2019-07-12 DIAGNOSIS — E78.1 HYPERTRIGLYCERIDEMIA: ICD-10-CM

## 2019-07-12 DIAGNOSIS — E78.5 DYSLIPIDEMIA (HIGH LDL; LOW HDL): ICD-10-CM

## 2019-07-12 DIAGNOSIS — N52.1 ERECTILE DYSFUNCTION DUE TO DISEASES CLASSIFIED ELSEWHERE: ICD-10-CM

## 2019-07-12 PROCEDURE — 99214 OFFICE O/P EST MOD 30 MIN: CPT | Mod: 25 | Performed by: FAMILY MEDICINE

## 2019-07-12 PROCEDURE — 90471 IMMUNIZATION ADMIN: CPT | Performed by: FAMILY MEDICINE

## 2019-07-12 PROCEDURE — 90715 TDAP VACCINE 7 YRS/> IM: CPT | Performed by: FAMILY MEDICINE

## 2019-07-12 ASSESSMENT — PATIENT HEALTH QUESTIONNAIRE - PHQ9: CLINICAL INTERPRETATION OF PHQ2 SCORE: 0

## 2019-07-12 ASSESSMENT — PAIN SCALES - GENERAL: PAINLEVEL: NO PAIN

## 2019-07-12 NOTE — ASSESSMENT & PLAN NOTE
Chronic ongoing medical condition.  Counseled on diet, exercise, and lifestyle changes to help with weight loss.

## 2019-07-12 NOTE — PROGRESS NOTES
CC:Diagnoses of Need for vaccination, Erectile dysfunction due to diseases classified elsewhere, Hypertriglyceridemia, Dyslipidemia (high LDL; low HDL), and Other specified hypothyroidism were pertinent to this visit.      HISTORY OF PRESENT ILLNESS: Patient is a 44 y.o. male established patient who presents today to follow-up on multiple medical conditions.    Health Maintenance: Completed    Erectile dysfunction  Chronic ongoing medical condition.  Currently well controlled with sildenafil.  Counseled on dietary control of triglycerides and hypothyroidism and how this may help with his erectile dysfunction.    Hypertriglyceridemia  Chronic ongoing medical condition.  Counseled on getting his repeat labs completed and dietary changes that will help lower his cholesterol levels as well as his triglycerides.    Dyslipidemia (high LDL; low HDL)  Chronic ongoing medical condition.  Currently well controlled with atorvastatin 20 mg daily.    Hypothyroidism  Chronic ongoing medical condition.  Currently well controlled on levothyroxine 75 mcg daily.      Patient Active Problem List    Diagnosis Date Noted   • Dyslipidemia (high LDL; low HDL) 07/12/2019   • High risk heterosexual behavior 10/18/2018   • Erectile dysfunction 10/18/2018   • Hypersomnolence 02/17/2015   • Hypertriglyceridemia 06/17/2014   • Vitamin D deficiency disease 06/16/2014   • History of Hodgkin's disease 05/08/2014   • Hypothyroidism 05/08/2014   • Fatigue 05/08/2014      Allergies:Patient has no known allergies.    Current Outpatient Prescriptions   Medication Sig Dispense Refill   • atorvastatin (LIPITOR) 20 MG Tab Take 1 Tab by mouth every day. 90 Tab 3   • levothyroxine (SYNTHROID) 75 MCG Tab Take 1 Tab by mouth Every morning on an empty stomach. 90 Tab 3   • sildenafil citrate (VIAGRA) 50 MG tablet Take 1 Tab by mouth as needed for Erectile Dysfunction. 30 Tab 3     No current facility-administered medications for this visit.        Social  "History   Substance Use Topics   • Smoking status: Former Smoker     Packs/day: 1.50     Years: 7.00     Types: Cigarettes     Quit date: 5/8/2011   • Smokeless tobacco: Never Used   • Alcohol use No     Social History     Social History Narrative   • No narrative on file       Family History   Problem Relation Age of Onset   • Other Father         etoh   • Alcohol/Drug Father    • Diabetes Father    • Lung Disease Father         smoker on O2   • Cancer Mother    • Cancer Paternal Grandmother         lung   • Stroke Paternal Grandfather        Review of Systems:   Constitutional: No Fevers, Chills  Eyes: No vision changes  ENT: No hearing changes  Resp: No Shortness of breath  CV: No Chest pain  GI: No Nausea/Vomiting  MSK: No weakness  Skin: No rashes  Neuro: No Headaches  Psych: No Suicidal ideations    All remaining systems reviewed and found to be negative, except as stated above.    Exam:    /80 (BP Location: Left arm, Patient Position: Sitting, BP Cuff Size: Adult long)   Pulse 91   Temp 36.9 °C (98.4 °F)   Ht 1.753 m (5' 9\")   Wt 110.7 kg (244 lb)   SpO2 95%  Body mass index is 36.03 kg/m².    General:  Well nourished, well developed male in NAD  HENT: Atraumatic, normocephalic  EYES: Extraocular movements intact, pupils equal and reactive to light  NECK: Supple, FROM  CHEST: No deformities, Equal chest expansion  RESP: Unlabored, no stridor or audible wheeze  ABD: Soft, Nontender, Non-Distended  Extremities: No Clubbing, Cyanosis, or Edema  Skin: Warm/dry, without rashes  Neuro: A/O x 4, CN 2-12 Grossly intact, Motor/sensory grossly intact  Psych: Normal behavior, normal affect        Assessment/Plan:  1. Need for vaccination  - Tdap Vaccine =>6YO IM    2. Erectile dysfunction due to diseases classified elsewhere  Chronic ongoing medical condition continue as needed sildenafil    3. Hypertriglyceridemia  Chronic ongoing medical condition.  Please complete previously ordered metabolic panel and " lipids.  Counseled on dietary changes to help improve triglyceride levels.    4. Dyslipidemia (high LDL; low HDL)  Chronic ongoing medical condition.  Continue atorvastatin 20 mg daily.    5. Other specified hypothyroidism  Chronic ongoing medical condition.  Continue levothyroxine 75 mcg daily.  No changes indicated at this time peer      Follow-up in 1 year or sooner as needed.    Please note that this dictation was created using voice recognition software. I have worked with consultants from the vendor as well as technical experts from [employer] to optimize the interface. I have made every reasonable attempt to correct obvious errors, but I expect that there are errors of grammar and possibly content that I did not discover before finalizing the note.

## 2019-07-12 NOTE — ASSESSMENT & PLAN NOTE
Chronic ongoing medical condition.  Counseled on getting his repeat labs completed and dietary changes that will help lower his cholesterol levels as well as his triglycerides.

## 2019-07-12 NOTE — ASSESSMENT & PLAN NOTE
Chronic ongoing medical condition.  Currently well controlled with sildenafil.  Counseled on dietary control of triglycerides and hypothyroidism and how this may help with his erectile dysfunction.

## 2019-10-24 ENCOUNTER — OFFICE VISIT (OUTPATIENT)
Dept: MEDICAL GROUP | Facility: PHYSICIAN GROUP | Age: 44
End: 2019-10-24
Payer: COMMERCIAL

## 2019-10-24 VITALS
BODY MASS INDEX: 35.99 KG/M2 | DIASTOLIC BLOOD PRESSURE: 68 MMHG | TEMPERATURE: 97.8 F | SYSTOLIC BLOOD PRESSURE: 106 MMHG | HEIGHT: 69 IN | WEIGHT: 243 LBS | OXYGEN SATURATION: 95 % | HEART RATE: 100 BPM

## 2019-10-24 DIAGNOSIS — Z23 NEED FOR VACCINATION: ICD-10-CM

## 2019-10-24 DIAGNOSIS — M94.0 COSTOCHONDRITIS, ACUTE: ICD-10-CM

## 2019-10-24 PROCEDURE — 90686 IIV4 VACC NO PRSV 0.5 ML IM: CPT | Performed by: FAMILY MEDICINE

## 2019-10-24 PROCEDURE — 99213 OFFICE O/P EST LOW 20 MIN: CPT | Mod: 25 | Performed by: FAMILY MEDICINE

## 2019-10-24 PROCEDURE — 90471 IMMUNIZATION ADMIN: CPT | Performed by: FAMILY MEDICINE

## 2019-10-24 ASSESSMENT — ENCOUNTER SYMPTOMS
DIARRHEA: 0
FEVER: 0
SHORTNESS OF BREATH: 0
VOMITING: 0
PALPITATIONS: 0
WHEEZING: 0
ORTHOPNEA: 0
CHILLS: 0
CLAUDICATION: 0
WEIGHT LOSS: 0
NAUSEA: 0
HEADACHES: 0
DIAPHORESIS: 0
SPUTUM PRODUCTION: 0
PND: 0
DEPRESSION: 0
ABDOMINAL PAIN: 0
COUGH: 1

## 2019-10-24 ASSESSMENT — LIFESTYLE VARIABLES: SUBSTANCE_ABUSE: 0

## 2019-10-24 ASSESSMENT — PAIN SCALES - GENERAL: PAINLEVEL: NO PAIN

## 2019-10-24 NOTE — PROGRESS NOTES
Subjective:      Sammy May is a 44 y.o. male who presents with Chest Pain            This is a relatively healthy 44-year-old male who presents for acute sharp chest pain after coughing this morning.  Patient states that the tenderness is pinpoint and at worst 8/10 in severity.  Does not radiate.  Does not seem to be exacerbated by activity or alleviated by rest.  Denies any shortness of breath, diaphoresis, nausea, vomiting associated with this.  States that he will often wake up cough once or twice and get a sharp twinge of pain in the front of his chest however yesterday after a big heavy cough he got the same sharp twinge of pain but it lingered for the remainder of the day however pain is currently resolved.  Patient has not tried any over-the-counter medications or remedies.      Review of Systems   Constitutional: Negative for chills, diaphoresis, fever, malaise/fatigue and weight loss.   Respiratory: Positive for cough (Throat clearing). Negative for sputum production, shortness of breath and wheezing.    Cardiovascular: Positive for chest pain. Negative for palpitations, orthopnea, claudication, leg swelling and PND.   Gastrointestinal: Negative for abdominal pain, diarrhea, nausea and vomiting.   Neurological: Negative for headaches.   Psychiatric/Behavioral: Negative for depression, substance abuse and suicidal ideas.   All other systems reviewed and are negative.    I reviewed the following    Past Medical History:   Diagnosis Date   • ED (erectile dysfunction)    • Hodgkin disease (HCC)     age 21, treated at [facility]   • Hyperlipidemia    • Hypothyroidism         Past Surgical History:   Procedure Laterality Date   • LYMPH NODE EXCISION Right 1997   • DENTAL EXTRACTION(S)      wisdom teeth       No Known Allergies    Current Outpatient Medications   Medication Sig Dispense Refill   • atorvastatin (LIPITOR) 20 MG Tab Take 1 Tab by mouth every day. 90 Tab 3   • levothyroxine (SYNTHROID) 75 MCG Tab  Take 1 Tab by mouth Every morning on an empty stomach. 90 Tab 3   • sildenafil citrate (VIAGRA) 50 MG tablet Take 1 Tab by mouth as needed for Erectile Dysfunction. 30 Tab 3     No current facility-administered medications for this visit.         Family History   Problem Relation Age of Onset   • Other Father         etoh   • Alcohol/Drug Father    • Diabetes Father    • Lung Disease Father         smoker on O2   • Cancer Mother    • Cancer Paternal Grandmother         lung   • Stroke Paternal Grandfather        Social History     Socioeconomic History   • Marital status: Unknown     Spouse name: Not on file   • Number of children: Not on file   • Years of education: Not on file   • Highest education level: Not on file   Occupational History   • Not on file   Social Needs   • Financial resource strain: Not on file   • Food insecurity:     Worry: Not on file     Inability: Not on file   • Transportation needs:     Medical: Not on file     Non-medical: Not on file   Tobacco Use   • Smoking status: Former Smoker     Packs/day: 1.50     Years: 7.00     Pack years: 10.50     Types: Cigarettes     Last attempt to quit: 2011     Years since quittin.4   • Smokeless tobacco: Never Used   Substance and Sexual Activity   • Alcohol use: No   • Drug use: No     Comment: hx drug use, no IV; mostly MJ   • Sexual activity: Yes     Partners: Female     Birth control/protection: Coitus Interrupt[employer], IUD   Lifestyle   • Physical activity:     Days per week: Not on file     Minutes per session: Not on file   • Stress: Not on file   Relationships   • Social connections:     Talks on phone: Not on file     Gets together: Not on file     Attends Zoroastrian service: Not on file     Active member of club or organization: Not on file     Attends meetings of clubs or organizations: Not on file     Relationship status: Not on file   • Intimate partner violence:     Fear of current or ex partner: Not on file     Emotionally abused: Not on  "file     Physically abused: Not on file     Forced sexual activity: Not on file   Other Topics Concern   • Not on file   Social History Narrative   • Not on file         Objective:     /68 (BP Location: Left arm, Patient Position: Sitting, BP Cuff Size: Adult)   Pulse 100   Temp 36.6 °C (97.8 °F)   Ht 1.753 m (5' 9\")   Wt 110.2 kg (243 lb)   SpO2 95%   BMI 35.88 kg/m²      Physical Exam   Constitutional: He is oriented to person, place, and time. He appears well-developed and well-nourished.   HENT:   Head: Normocephalic and atraumatic.   Eyes: Pupils are equal, round, and reactive to light. EOM are normal.   Neck: Normal range of motion. Neck supple.   Cardiovascular: Normal rate, normal heart sounds and intact distal pulses. Exam reveals no gallop and no friction rub.   No murmur heard.  Pulmonary/Chest: Effort normal and breath sounds normal. No stridor. No respiratory distress. He has no wheezes. He has no rales. He exhibits tenderness (Pinpoint reproducible chest pain at approximately the T6 articulation with the sternum).   Abdominal: He exhibits no distension.   Musculoskeletal: Normal range of motion.   Neurological: He is alert and oriented to person, place, and time. No cranial nerve deficit.   Skin: Skin is warm and dry.   Psychiatric: He has a normal mood and affect. His behavior is normal.        Assessment/Plan:     1. Need for vaccination  - Influenza Vaccine Quad Injection (PF)    2. Costochondritis, acute  New problem to examiner.  Counseled on OTC ibuprofen and follow-up if recurring or worse or not improving.    Follow-up PRN    Please note that this dictation was created using voice recognition software. I have worked with consultants from the vendor as well as technical experts from [employer] to optimize the interface. I have made every reasonable attempt to correct obvious errors, but I expect that there are errors of grammar and possibly content that I did not discover before " finalizing the note.

## 2019-11-01 ENCOUNTER — OFFICE VISIT (OUTPATIENT)
Dept: MEDICAL GROUP | Facility: PHYSICIAN GROUP | Age: 44
End: 2019-11-01
Payer: COMMERCIAL

## 2019-11-01 VITALS
WEIGHT: 242 LBS | OXYGEN SATURATION: 98 % | HEIGHT: 69 IN | TEMPERATURE: 97.7 F | BODY MASS INDEX: 35.84 KG/M2 | SYSTOLIC BLOOD PRESSURE: 108 MMHG | HEART RATE: 91 BPM | DIASTOLIC BLOOD PRESSURE: 80 MMHG

## 2019-11-01 DIAGNOSIS — M54.81 OCCIPITAL NEURALGIA OF RIGHT SIDE: ICD-10-CM

## 2019-11-01 PROCEDURE — 99213 OFFICE O/P EST LOW 20 MIN: CPT | Performed by: FAMILY MEDICINE

## 2019-11-01 ASSESSMENT — ENCOUNTER SYMPTOMS
CHILLS: 0
FOCAL WEAKNESS: 0
HEADACHES: 1
WEAKNESS: 0
DIZZINESS: 0
MYALGIAS: 0
VOMITING: 0
BLURRED VISION: 0
SORE THROAT: 0
NAUSEA: 0
ABDOMINAL PAIN: 0
SENSORY CHANGE: 0
EYE PAIN: 0
WEIGHT LOSS: 0
DEPRESSION: 0
SHORTNESS OF BREATH: 0
PHOTOPHOBIA: 0
SINUS PAIN: 0
SPEECH CHANGE: 0
COUGH: 1
DOUBLE VISION: 0
BACK PAIN: 0
FEVER: 0
NECK PAIN: 1

## 2019-11-01 ASSESSMENT — LIFESTYLE VARIABLES: SUBSTANCE_ABUSE: 0

## 2019-11-01 ASSESSMENT — PAIN SCALES - GENERAL: PAINLEVEL: 10=SEVERE PAIN

## 2019-11-01 NOTE — PROGRESS NOTES
Subjective:      Sammy May is a 44 y.o. male who presents with Head Pain            This is an otherwise healthy 44-year-old male who presents for 6 days of sharp stabbing headache radiating from the occiput anteriorly on the right side of his head.  States that it is worse with coughing and certain positions.  Pain progressively worsened for 3 days since onset 6 days ago and has since been progressively improving over the last 3 days.  Denies any photophobia, phonophobia, nausea, vomiting.  Denies any lacrimation.  Denies any thunderclap onset.  Pain 5/10 in severity.  Nothing seems to make it better or worse.  Denies any visual acuity changes, weakness, fevers or chills.      Review of Systems   Constitutional: Negative for chills, fever, malaise/fatigue and weight loss.   HENT: Negative for congestion, ear pain, sinus pain and sore throat.    Eyes: Negative for blurred vision, double vision, photophobia and pain.   Respiratory: Positive for cough. Negative for shortness of breath.    Cardiovascular: Negative for chest pain.   Gastrointestinal: Negative for abdominal pain, nausea and vomiting.   Musculoskeletal: Positive for neck pain (Sore muscles). Negative for back pain, joint pain and myalgias.   Neurological: Positive for headaches. Negative for dizziness, sensory change, speech change, focal weakness and weakness.   Endo/Heme/Allergies: Negative for environmental allergies.   Psychiatric/Behavioral: Negative for depression, substance abuse and suicidal ideas.     I reviewed the following    Past Medical History:   Diagnosis Date   • ED (erectile dysfunction)    • Hodgkin disease (HCC)     age 21, treated at [facility]   • Hyperlipidemia    • Hypothyroidism         Past Surgical History:   Procedure Laterality Date   • LYMPH NODE EXCISION Right 1997   • DENTAL EXTRACTION(S)      wisdom teeth       No Known Allergies    Current Outpatient Medications   Medication Sig Dispense Refill   • atorvastatin  (LIPITOR) 20 MG Tab Take 1 Tab by mouth every day. 90 Tab 3   • levothyroxine (SYNTHROID) 75 MCG Tab Take 1 Tab by mouth Every morning on an empty stomach. 90 Tab 3   • sildenafil citrate (VIAGRA) 50 MG tablet Take 1 Tab by mouth as needed for Erectile Dysfunction. 30 Tab 3     No current facility-administered medications for this visit.         Family History   Problem Relation Age of Onset   • Other Father         etoh   • Alcohol/Drug Father    • Diabetes Father    • Lung Disease Father         smoker on O2   • Cancer Mother    • Cancer Paternal Grandmother         lung   • Stroke Paternal Grandfather        Social History     Socioeconomic History   • Marital status: Unknown     Spouse name: Not on file   • Number of children: Not on file   • Years of education: Not on file   • Highest education level: Not on file   Occupational History   • Not on file   Social Needs   • Financial resource strain: Not on file   • Food insecurity:     Worry: Not on file     Inability: Not on file   • Transportation needs:     Medical: Not on file     Non-medical: Not on file   Tobacco Use   • Smoking status: Former Smoker     Packs/day: 1.50     Years: 7.00     Pack years: 10.50     Types: Cigarettes     Last attempt to quit: 2011     Years since quittin.4   • Smokeless tobacco: Never Used   Substance and Sexual Activity   • Alcohol use: No   • Drug use: No     Comment: hx drug use, no IV; mostly MJ   • Sexual activity: Yes     Partners: Female     Birth control/protection: Coitus Interruptus, IUD   Lifestyle   • Physical activity:     Days per week: Not on file     Minutes per session: Not on file   • Stress: Not on file   Relationships   • Social connections:     Talks on phone: Not on file     Gets together: Not on file     Attends [employer] service: Not on file     Active member of club or organization: Not on file     Attends meetings of clubs or organizations: Not on file     Relationship status: Not on file   •  "Intimate partner violence:     Fear of current or ex partner: Not on file     Emotionally abused: Not on file     Physically abused: Not on file     Forced sexual activity: Not on file   Other Topics Concern   • Not on file   Social History Narrative   • Not on file           Objective:     /80 (BP Location: Left arm, Patient Position: Sitting, BP Cuff Size: Adult)   Pulse 91   Temp 36.5 °C (97.7 °F)   Ht 1.753 m (5' 9\")   Wt 109.8 kg (242 lb)   SpO2 98%   BMI 35.74 kg/m²      Physical Exam   Constitutional: He is oriented to person, place, and time. He appears well-developed and well-nourished.   HENT:   Head: Normocephalic and atraumatic.   Eyes: Pupils are equal, round, and reactive to light. EOM are normal.   Neck: Normal range of motion. Neck supple. No JVD present. No tracheal deviation present. No thyromegaly present.   Cardiovascular: Normal rate.   Pulmonary/Chest: Effort normal. No respiratory distress.   Abdominal: He exhibits no distension.   Musculoskeletal: Normal range of motion.   Lymphadenopathy:     He has no cervical adenopathy.   Neurological: He is alert and oriented to person, place, and time.   Skin: Skin is warm and dry.   Psychiatric: He has a normal mood and affect. His behavior is normal.        Assessment/Plan:     1. Occipital neuralgia of right side  New problem to examiner.  Counseled on massage therapy, stretching, OTC NSAIDs.  Handout on cervical spine home therapy provided.  Counseled on follow-up if not improving or worsening.    Follow-up PRN    Please note that this dictation was created using voice recognition software. I have worked with consultants from the vendor as well as technical experts from [employer] to optimize the interface. I have made every reasonable attempt to correct obvious errors, but I expect that there are errors of grammar and possibly content that I did not discover before finalizing the note.      "

## 2019-11-11 ENCOUNTER — OFFICE VISIT (OUTPATIENT)
Dept: URGENT CARE | Facility: PHYSICIAN GROUP | Age: 44
End: 2019-11-11
Payer: COMMERCIAL

## 2019-11-11 VITALS
RESPIRATION RATE: 20 BRPM | OXYGEN SATURATION: 95 % | DIASTOLIC BLOOD PRESSURE: 68 MMHG | TEMPERATURE: 100.1 F | HEART RATE: 116 BPM | WEIGHT: 246 LBS | SYSTOLIC BLOOD PRESSURE: 106 MMHG | BODY MASS INDEX: 36.43 KG/M2 | HEIGHT: 69 IN

## 2019-11-11 DIAGNOSIS — J11.1 INFLUENZA WITH UPPER RESPIRATORY SYMPTOMS: ICD-10-CM

## 2019-11-11 DIAGNOSIS — J10.1 INFLUENZA A: ICD-10-CM

## 2019-11-11 LAB
FLUAV+FLUBV AG SPEC QL IA: POSITIVE
INT CON NEG: NEGATIVE
INT CON POS: POSITIVE

## 2019-11-11 PROCEDURE — 87804 INFLUENZA ASSAY W/OPTIC: CPT | Performed by: NURSE PRACTITIONER

## 2019-11-11 PROCEDURE — 99214 OFFICE O/P EST MOD 30 MIN: CPT | Performed by: NURSE PRACTITIONER

## 2019-11-11 RX ORDER — OSELTAMIVIR PHOSPHATE 75 MG/1
75 CAPSULE ORAL 2 TIMES DAILY
Qty: 10 CAP | Refills: 0 | Status: SHIPPED | OUTPATIENT
Start: 2019-11-11 | End: 2021-03-23

## 2019-11-11 ASSESSMENT — ENCOUNTER SYMPTOMS
FOCAL WEAKNESS: 0
NAUSEA: 0
DIZZINESS: 0
SENSORY CHANGE: 0
ABDOMINAL PAIN: 0
SORE THROAT: 0
HEADACHES: 1
CONSTIPATION: 0
CHILLS: 1
DIARRHEA: 0
VOMITING: 0
FEVER: 1
COUGH: 0
BACK PAIN: 0

## 2019-11-11 ASSESSMENT — LIFESTYLE VARIABLES: SUBSTANCE_ABUSE: 0

## 2019-11-11 NOTE — LETTER
November 11, 2019       Patient: Sammy May   YOB: 1975   Date of Visit: 11/11/2019         To Whom It May Concern:    It is my medical opinion that Sammy May return to full duty, no restrictions on 11/14/19 due to medical illness.              Sincerely,          ARAVIND Garcia  Electronically Signed      Is This A New Presentation, Or A Follow-Up?: Phototherapy Treatment When Was Your Last Phototherapy Treatment?: 08/07/2019

## 2019-11-12 NOTE — PROGRESS NOTES
"Subjective:      Sammy May is a 44 y.o. male who presents with Fever (x 2 days, body aches, cough, runny nose, nasal drip)    Reviewed past medical, surgical and family history. Reviewed prescription and OTC medications with patient in electronic health record today      No Known Allergies          HPI is a new problem.  Sammy is a 44-year-old male patient presents with sudden onset of fever 2 days ago.  Associated symptoms include cough, runny nose, sore throat, body aches, fatigue, and headache.  Treatments tried been over-the-counter acetaminophen.  He has had mild relief of his symptoms with that.  He received an influenza vaccination approximately 1 and half months ago.  He has no known exposure to anyone with influenza.  No other aggravating relieving factors.    Review of Systems   Constitutional: Positive for chills and fever.   HENT: Negative for congestion and sore throat.    Respiratory: Negative for cough.    Cardiovascular: Negative for chest pain.   Gastrointestinal: Negative for abdominal pain, constipation, diarrhea, nausea and vomiting.   Musculoskeletal: Negative for back pain.   Neurological: Positive for headaches. Negative for dizziness, sensory change and focal weakness.   Psychiatric/Behavioral: Negative for substance abuse.          Objective:     /68   Pulse (!) 116   Temp 37.8 °C (100.1 °F) (Temporal)   Resp 20   Ht 1.753 m (5' 9\")   Wt 111.6 kg (246 lb)   SpO2 95%   BMI 36.33 kg/m²      Physical Exam  Vitals signs and nursing note reviewed.   Constitutional:       General: He is not in acute distress.     Appearance: Normal appearance. He is well-developed, well-groomed and overweight. He is ill-appearing. He is not toxic-appearing.   HENT:      Head: Normocephalic.      Right Ear: Hearing, ear canal and external ear normal. Tympanic membrane is injected.      Left Ear: Hearing, ear canal and external ear normal. Tympanic membrane is injected.      Nose: Mucosal edema " present.      Right Sinus: No maxillary sinus tenderness or frontal sinus tenderness.      Left Sinus: No maxillary sinus tenderness or frontal sinus tenderness.      Mouth/Throat:      Mouth: Mucous membranes are moist.      Pharynx: Uvula midline. Posterior oropharyngeal erythema present. No oropharyngeal exudate.      Tonsils: No tonsillar exudate.   Eyes:      General: Lids are normal.         Right eye: No discharge.         Left eye: No discharge.      Conjunctiva/sclera: Conjunctivae normal.      Pupils: Pupils are equal, round, and reactive to light.   Neck:      Musculoskeletal: Full passive range of motion without pain, normal range of motion and neck supple.      Trachea: Trachea and phonation normal.   Cardiovascular:      Rate and Rhythm: Normal rate and regular rhythm.      Pulses: Normal pulses.      Heart sounds: Normal heart sounds.   Pulmonary:      Effort: Pulmonary effort is normal. No respiratory distress.      Breath sounds: Normal breath sounds.   Chest:      Chest wall: No tenderness.   Abdominal:      Palpations: Abdomen is soft.      Tenderness: There is no tenderness.   Musculoskeletal: Normal range of motion.   Lymphadenopathy:      Head:      Right side of head: No submental, submandibular, tonsillar, preauricular, posterior auricular or occipital adenopathy.      Left side of head: No submental, submandibular, tonsillar, preauricular, posterior auricular or occipital adenopathy.      Cervical: No cervical adenopathy.      Upper Body:      Right upper body: No supraclavicular adenopathy.      Left upper body: No supraclavicular adenopathy.   Skin:     General: Skin is warm.      Capillary Refill: Capillary refill takes less than 2 seconds.      Findings: No rash.   Neurological:      Mental Status: He is alert and oriented to person, place, and time.   Psychiatric:         Mood and Affect: Mood normal.         Speech: Speech normal.         Behavior: Behavior normal. Behavior is  cooperative.         Thought Content: Thought content normal.            POCT influenza: positive A; negative B        Assessment/Plan:     1. Influenza A  oseltamivir (TAMIFLU) 75 MG Cap   2. Influenza with upper respiratory symptoms  POCT Influenza A/B           Educated in natural progression of disease with supportive care and symptomatic relief of symptoms. Educated in s/s that would need further evaluation and management in ER, UC or by PCP. Follow up prn for new or worsening symptoms.   Keep well hydrated- Increase rest  Treat fever > 101.5 with OTC ibuprofen or acetaminophen. Follow Dosage and safety directions of the .   Educated in infection control practices.   Do not return to work until fever free for 24 hours.     Patient was in agreement with this treatment plan and seemed to understand without barriers. All questions were encouraged and answered

## 2019-11-12 NOTE — PATIENT INSTRUCTIONS
"Influenza A (H1N1)  H1N1 formerly called \"swine flu\" is a new influenza virus causing sickness in people. The H1N1 virus is different from seasonal influenza viruses. However, the H1N1 symptoms are similar to seasonal influenza and it is spread from person to person. You may be at higher risk for serious problems if you have underlying serious medical conditions. The CDC and the World Health Organization are following reported cases around the world.  CAUSES   · The flu is thought to spread mainly person-to-person through coughing or sneezing of infected people.  · A person may become infected by touching something with the virus on it and then touching their mouth or nose.  SYMPTOMS   · Fever.  · Headache.  · Tiredness.  · Cough.  · Sore throat.  · Runny or stuffy nose.  · Body aches.  · Diarrhea and vomiting  These symptoms are referred to as \"flu-like symptoms.\" A lot of different illnesses, including the common cold, may have similar symptoms.  DIAGNOSIS   · There are tests that can tell if you have the H1N1 virus.  · Confirmed cases of H1N1 will be reported to the state or local health department.  · A doctor's exam may be needed to tell whether you have an infection that is a complication of the flu.  HOME CARE INSTRUCTIONS   · Stay informed. Visit the CDC website for current recommendations. Visit www.cdc.gov/K4R4sfr/. You may also call 9-298-CSF-INFO (1-802.345.8516).  · Get help early if you develop any of the above symptoms.  · If you are at high risk from complications of the flu, talk to your caregiver as soon as you develop flu-like symptoms. Those at higher risk for complications include:  · People 65 years or older.  · People with chronic medical conditions.  · Pregnant women.  · Young children.  · Your caregiver may recommend antiviral medicine to help treat the flu.  · If you get the flu, get plenty of rest, drink enough water and fluids to keep your urine clear or pale yellow, and avoid using " alcohol or tobacco.  · You may take over-the-counter medicine to relieve the symptoms of the flu if your caregiver approves. (Never give aspirin to children or teenagers who have flu-like symptoms, particularly fever).  TREATMENT   If you do get sick, antiviral drugs are available. These drugs can make your illness milder and make you feel better faster. Treatment should start soon after illness starts. It is only effective if taken within the first day of becoming ill. Only your caregiver can prescribe antiviral medication.   PREVENTION   · Cover your nose and mouth with a tissue or your arm when you cough or sneeze. Throw the tissue away.  · Wash your hands often with soap and warm water, especially after you cough or sneeze. Alcohol-based  are also effective against germs.  · Avoid touching your eyes, nose or mouth. This is one way germs spread.  · Try to avoid contact with sick people. Follow public health advice regarding school closures. Avoid crowds.  · Stay home if you get sick. Limit contact with others to keep from infecting them. People infected with the H1N1 virus may be able to infect others anywhere from 1 day before feeling sick to 5-7 days after getting flu symptoms.  · An H1N1 vaccine is available to help protect against the virus. In addition to the H1N1 vaccine, you will need to be vaccinated for seasonal influenza. The H1N1 and seasonal vaccines may be given on the same day. The CDC especially recommends the H1N1 vaccine for:  · Pregnant women.  · People who live with or care for children younger than 6 months of age.  · Health care and emergency services personnel.  · Persons between the ages of 6 months through 24 years of age.  · People from ages 25 through 64 years who are at higher risk for H1N1 because of chronic health disorders or immune system problems.  FACEMASKS  In community and home settings, the use of facemasks and N95 respirators are not normally recommended. In certain  circumstances, a facemask or N95 respirator may be used for persons at increased risk of severe illness from influenza. Your caregiver can give additional recommendations for facemask use.  IN CHILDREN, EMERGENCY WARNING SIGNS THAT NEED URGENT MEDICAL CARE:  · Fast breathing or trouble breathing.  · Bluish skin color.  · Not drinking enough fluids.  · Not waking up or not interacting normally.  · Being so fussy that the child does not want to be held.  · Your child has an oral temperature above 102° F (38.9° C), not controlled by medicine.  · Your baby is older than 3 months with a rectal temperature of 102° F (38.9° C) or higher.  · Your baby is 3 months old or younger with a rectal temperature of 100.4° F (38° C) or higher.  · Flu-like symptoms improve but then return with fever and worse cough.  IN ADULTS, EMERGENCY WARNING SIGNS THAT NEED URGENT MEDICAL CARE:  · Difficulty breathing or shortness of breath.  · Pain or pressure in the chest or abdomen.  · Sudden dizziness.  · Confusion.  · Severe or persistent vomiting.  · Bluish color.  · You have a oral temperature above 102° F (38.9° C), not controlled by medicine.  · Flu-like symptoms improve but return with fever and worse cough.  SEEK IMMEDIATE MEDICAL CARE IF:   You or someone you know is experiencing any of the above symptoms. When you arrive at the emergency center, report that you think you have the flu. You may be asked to wear a mask and/or sit in a secluded area to protect others from getting sick.  MAKE SURE YOU:   · Understand these instructions.  · Will watch your condition.  · Will get help right away if you are not doing well or get worse.  Some of this information courtesy of the CDC.   Document Released: 06/05/2009 Document Revised: 03/11/2013 Document Reviewed: 06/05/2009  [employer]® Patient Information ©2014 [employer].

## 2019-12-03 DIAGNOSIS — E03.8 OTHER SPECIFIED HYPOTHYROIDISM: ICD-10-CM

## 2019-12-03 RX ORDER — LEVOTHYROXINE SODIUM 0.07 MG/1
75 TABLET ORAL
Qty: 90 TAB | Refills: 4 | Status: SHIPPED | OUTPATIENT
Start: 2019-12-03 | End: 2021-02-17

## 2019-12-05 ENCOUNTER — TELEPHONE (OUTPATIENT)
Dept: URGENT CARE | Facility: PHYSICIAN GROUP | Age: 44
End: 2019-12-05

## 2020-03-05 DIAGNOSIS — E78.1 HYPERTRIGLYCERIDEMIA: ICD-10-CM

## 2020-03-05 RX ORDER — ATORVASTATIN CALCIUM 20 MG/1
20 TABLET, FILM COATED ORAL DAILY
Qty: 90 TAB | Refills: 0 | Status: CANCELLED | OUTPATIENT
Start: 2020-03-05

## 2020-03-06 DIAGNOSIS — E78.1 HYPERTRIGLYCERIDEMIA: ICD-10-CM

## 2020-03-06 RX ORDER — ATORVASTATIN CALCIUM 20 MG/1
20 TABLET, FILM COATED ORAL DAILY
Qty: 90 TAB | Refills: 4 | Status: SHIPPED | OUTPATIENT
Start: 2020-03-06 | End: 2021-05-28 | Stop reason: SDUPTHER

## 2020-03-07 NOTE — TELEPHONE ENCOUNTER
Received request via: Patient    Was the patient seen in the last year in this department? Yes    Does the patient have an active prescription (recently filled or refills available) for medication(s) requested? No     Pt called and was very upset that he was told his Rx was sent over and he has gone to the pharmacy 3 times and his Rx still has not been sent over. Pt stated that he is out of his medication and needs this filled ASAP

## 2020-12-30 ENCOUNTER — TELEPHONE (OUTPATIENT)
Dept: MEDICAL GROUP | Facility: PHYSICIAN GROUP | Age: 45
End: 2020-12-30

## 2020-12-30 NOTE — TELEPHONE ENCOUNTER
Future Appointments       Provider Department Center    1/6/2021 1:00 PM Juan A Lamb M.D. [facility] [place]        ESTABLISHED PATIENT PRE-VISIT PLANNING     Patient was NOT contacted to complete PVP.     Note: Patient will not be contacted if there is no indication to call.     1.  Reviewed notes from the last few office visits within the medical group: Yes,  11/01/2019    2.  If any orders were placed at last visit or intended to be done for this visit (i.e. 6 mos follow-up), do we have Results/Consult Notes?         •  Labs - Labs were not ordered at last office visit.  Note: If patient appointment is for lab review and patient did not complete labs, check with provider if OK to reschedule patient until labs completed.       •  Imaging - Imaging was not ordered at last office visit.       •  Referrals - No referrals were ordered at last office visit.    3. Is this appointment scheduled as a Hospital Follow-Up? No    4.  Immunizations were updated in Epic using Reconcile Outside Information activity? Yes    5.  Patient is due for the following Health Maintenance Topics:   Health Maintenance Due   Topic Date Due   • IMM INFLUENZA (1) 09/01/2020       - Patient already has appointment scheduled for Immunizations: FLU    6.  AHA (Pulse8) form printed for Provider? N/A

## 2021-01-06 ENCOUNTER — APPOINTMENT (OUTPATIENT)
Dept: MEDICAL GROUP | Facility: PHYSICIAN GROUP | Age: 46
End: 2021-01-06
Payer: COMMERCIAL

## 2021-02-17 DIAGNOSIS — E03.8 OTHER SPECIFIED HYPOTHYROIDISM: ICD-10-CM

## 2021-02-17 RX ORDER — LEVOTHYROXINE SODIUM 0.07 MG/1
TABLET ORAL
Qty: 90 TABLET | Refills: 0 | Status: SHIPPED | OUTPATIENT
Start: 2021-02-17 | End: 2021-05-28 | Stop reason: SDUPTHER

## 2021-02-17 NOTE — TELEPHONE ENCOUNTER
Received request via: Pharmacy    Was the patient seen in the last year in this department? No   Last office visit 11/2/2019    Does the patient have an active prescription (recently filled or refills available) for medication(s) requested? No

## 2021-02-18 NOTE — TELEPHONE ENCOUNTER
Phone Number Called: [phone] (home)     I spoke to the patient ad let him know that his levothyroxine had been refilled, however, per Dr Lamb, that  further refills could not be done without the patient being seen in the office.  The patient has appointments for himself and two sons on March 5, 2021 with Dr. Lamb.

## 2021-03-23 ENCOUNTER — HOSPITAL ENCOUNTER (OUTPATIENT)
Dept: RADIOLOGY | Facility: MEDICAL CENTER | Age: 46
End: 2021-03-23
Attending: FAMILY MEDICINE
Payer: COMMERCIAL

## 2021-03-23 ENCOUNTER — OFFICE VISIT (OUTPATIENT)
Dept: MEDICAL GROUP | Facility: PHYSICIAN GROUP | Age: 46
End: 2021-03-23
Payer: COMMERCIAL

## 2021-03-23 VITALS
WEIGHT: 262 LBS | TEMPERATURE: 97.3 F | HEIGHT: 69 IN | BODY MASS INDEX: 38.8 KG/M2 | HEART RATE: 88 BPM | DIASTOLIC BLOOD PRESSURE: 78 MMHG | OXYGEN SATURATION: 96 % | SYSTOLIC BLOOD PRESSURE: 120 MMHG

## 2021-03-23 DIAGNOSIS — E03.8 OTHER SPECIFIED HYPOTHYROIDISM: ICD-10-CM

## 2021-03-23 DIAGNOSIS — E66.9 OBESITY (BMI 35.0-39.9 WITHOUT COMORBIDITY): ICD-10-CM

## 2021-03-23 DIAGNOSIS — Z77.120 MOLD EXPOSURE: ICD-10-CM

## 2021-03-23 DIAGNOSIS — R06.83 SNORING: ICD-10-CM

## 2021-03-23 DIAGNOSIS — E78.5 DYSLIPIDEMIA (HIGH LDL; LOW HDL): ICD-10-CM

## 2021-03-23 DIAGNOSIS — Z23 NEED FOR VACCINATION: ICD-10-CM

## 2021-03-23 PROCEDURE — 99214 OFFICE O/P EST MOD 30 MIN: CPT | Mod: 25 | Performed by: FAMILY MEDICINE

## 2021-03-23 PROCEDURE — 90686 IIV4 VACC NO PRSV 0.5 ML IM: CPT | Performed by: FAMILY MEDICINE

## 2021-03-23 PROCEDURE — 90471 IMMUNIZATION ADMIN: CPT | Performed by: FAMILY MEDICINE

## 2021-03-23 PROCEDURE — 71046 X-RAY EXAM CHEST 2 VIEWS: CPT

## 2021-03-23 ASSESSMENT — PATIENT HEALTH QUESTIONNAIRE - PHQ9: CLINICAL INTERPRETATION OF PHQ2 SCORE: 0

## 2021-03-23 NOTE — PROGRESS NOTES
This note is formatted in an APSO format, for additional subjective and objective evaluation please scroll to the bottom of the note.    CC:Diagnoses of Need for vaccination, Snoring, Other specified hypothyroidism, Dyslipidemia (high LDL; low HDL), Obesity (BMI 35.0-39.9 without comorbidity), and Mold exposure were pertinent to this visit.    Patient presents today with concerns regarding possible black mold exposure in his bathroom.  Patient spent extensive time trying to clean and eradicate the black mold but has not sought professional help for assistance in doing so.  Patient believes that previous year of worsening allergies, cough, sinus pressure and sleep disturbances may be related to this as they have improved since he attempted to remove black mold himself.  He is also endorsed recurrent headaches which seem to be positional.  Patient states that when he rests his head back on a headrest of either his car or a high back chair that the back of his head becomes extremely tender and experiences sharp shooting pains.  He also notices that he is having increasing sleep disturbances he is waking up several times per night.  Has a history of severe snoring.  Endorses daytime fatigue and can easily fall asleep while watching TV with his sons, but denies ever falling asleep at the wheel or morning headaches.    LABS: 2018: Results reviewed and discussed with the patient, questions answered.        1. Need for vaccination  Influenza Vaccine Quad Injection (PF)   2. Snoring  REFERRAL TO PULMONARY AND SLEEP MEDICINE   3. Other specified hypothyroidism  TSH    FREE THYROXINE  Continue levothyroxine 75 mcg daily.   4. Dyslipidemia (high LDL; low HDL)  CBC WITH DIFFERENTIAL    Comp Metabolic Panel    Lipid Profile  Continue atorvastatin 20 mg daily   5. Obesity (BMI 35.0-39.9 without comorbidity)  Comp Metabolic Panel    HEMOGLOBIN A1C   6. Mold exposure  DX-CHEST-2 VIEWS         Patient Active Problem List    Diagnosis  "Date Noted   • Dyslipidemia (high LDL; low HDL) 2019   • Obesity (BMI 35.0-39.9 without comorbidity) 2019   • High risk heterosexual behavior 10/18/2018   • Erectile dysfunction 10/18/2018   • Hypersomnolence 2015   • Hypertriglyceridemia 2014   • Vitamin D deficiency disease 2014   • History of Hodgkin's disease 2014   • Hypothyroidism 2014   • Fatigue 2014      Allergies:Patient has no known allergies.    Current Outpatient Medications   Medication Sig Dispense Refill   • levothyroxine (SYNTHROID) 75 MCG Tab TAKE 1 TABLET BY MOUTH IN THE MORNING ON AN EMPTY STOMACH 90 tablet 0   • atorvastatin (LIPITOR) 20 MG Tab Take 1 Tab by mouth every day. 90 Tab 4   • sildenafil citrate (VIAGRA) 50 MG tablet Take 1 Tab by mouth as needed for Erectile Dysfunction. 30 Tab 3   • oseltamivir (TAMIFLU) 75 MG Cap Take 1 Cap by mouth 2 times a day. 10 Cap 0     No current facility-administered medications for this visit.       Social History     Tobacco Use   • Smoking status: Former Smoker     Packs/day: 1.50     Years: 7.00     Pack years: 10.50     Types: Cigarettes     Quit date: 2011     Years since quittin.8   • Smokeless tobacco: Never Used   Substance Use Topics   • Alcohol use: No   • Drug use: No     Comment: hx drug use, no IV; mostly MJ     Social History     Social History Narrative   • Not on file       Family History   Problem Relation Age of Onset   • Other Father         etoh   • Alcohol/Drug Father    • Diabetes Father    • Lung Disease Father         smoker on O2   • Cancer Mother    • Cancer Paternal Grandmother         lung   • Stroke Paternal Grandfather          Exam:    /78 (BP Location: Left arm, Patient Position: Sitting)   Pulse 88   Temp 36.3 °C (97.3 °F) (Temporal)   Ht 1.753 m (5' 9\")   Wt 119 kg (262 lb)   SpO2 96%  Body mass index is 38.69 kg/m².    General:  Well nourished, well developed male in NAD, obese secondary to increase lean " muscle mass  HENT: Atraumatic, normocephalic  EYES: Extraocular movements intact, pupils equal and reactive to light  NECK: Supple, FROM  CHEST: No deformities, Equal chest expansion  RESP: Unlabored, no stridor or audible wheeze  ABD: Non-Distended  Extremities: No Clubbing, Cyanosis, or Edema  Skin: Warm/dry, without rashes  Neuro: A/O x 4, CN 2-12 Grossly intact, Motor/sensory grossly intact  Psych: Normal behavior, normal affect    Please note that this dictation was created using voice recognition software. I have worked with consultants from the vendor as well as technical experts from [facility] to optimize the interface. I have made every reasonable attempt to correct obvious errors, but I expect that there are errors of grammar and possibly content that I did not discover before finalizing the note.

## 2021-04-07 ENCOUNTER — IMMUNIZATION (OUTPATIENT)
Dept: FAMILY PLANNING/WOMEN'S HEALTH CLINIC | Facility: IMMUNIZATION CENTER | Age: 46
End: 2021-04-07
Payer: COMMERCIAL

## 2021-04-07 DIAGNOSIS — Z23 ENCOUNTER FOR VACCINATION: Primary | ICD-10-CM

## 2021-04-07 PROCEDURE — 91300 PFIZER SARS-COV-2 VACCINE: CPT | Performed by: INTERNAL MEDICINE

## 2021-04-07 PROCEDURE — 0001A PFIZER SARS-COV-2 VACCINE: CPT | Performed by: INTERNAL MEDICINE

## 2021-04-29 ENCOUNTER — IMMUNIZATION (OUTPATIENT)
Dept: FAMILY PLANNING/WOMEN'S HEALTH CLINIC | Facility: IMMUNIZATION CENTER | Age: 46
End: 2021-04-29
Payer: COMMERCIAL

## 2021-04-29 DIAGNOSIS — Z23 ENCOUNTER FOR VACCINATION: Primary | ICD-10-CM

## 2021-04-29 PROCEDURE — 91300 PFIZER SARS-COV-2 VACCINE: CPT

## 2021-04-29 PROCEDURE — 0002A PFIZER SARS-COV-2 VACCINE: CPT

## 2021-05-22 ENCOUNTER — HOSPITAL ENCOUNTER (OUTPATIENT)
Dept: LAB | Facility: MEDICAL CENTER | Age: 46
End: 2021-05-22
Attending: FAMILY MEDICINE
Payer: COMMERCIAL

## 2021-05-22 DIAGNOSIS — E03.8 OTHER SPECIFIED HYPOTHYROIDISM: ICD-10-CM

## 2021-05-22 DIAGNOSIS — E78.5 DYSLIPIDEMIA (HIGH LDL; LOW HDL): ICD-10-CM

## 2021-05-22 DIAGNOSIS — E66.9 OBESITY (BMI 35.0-39.9 WITHOUT COMORBIDITY): ICD-10-CM

## 2021-05-22 LAB
ALBUMIN SERPL BCP-MCNC: 4.1 G/DL (ref 3.2–4.9)
ALBUMIN/GLOB SERPL: 1.2 G/DL
ALP SERPL-CCNC: 80 U/L (ref 30–99)
ALT SERPL-CCNC: 41 U/L (ref 2–50)
ANION GAP SERPL CALC-SCNC: 10 MMOL/L (ref 7–16)
AST SERPL-CCNC: 29 U/L (ref 12–45)
BASOPHILS # BLD AUTO: 1.5 % (ref 0–1.8)
BASOPHILS # BLD: 0.1 K/UL (ref 0–0.12)
BILIRUB SERPL-MCNC: 0.4 MG/DL (ref 0.1–1.5)
BUN SERPL-MCNC: 16 MG/DL (ref 8–22)
CALCIUM SERPL-MCNC: 8.9 MG/DL (ref 8.5–10.5)
CHLORIDE SERPL-SCNC: 105 MMOL/L (ref 96–112)
CHOLEST SERPL-MCNC: 171 MG/DL (ref 100–199)
CO2 SERPL-SCNC: 24 MMOL/L (ref 20–33)
CREAT SERPL-MCNC: 0.81 MG/DL (ref 0.5–1.4)
EOSINOPHIL # BLD AUTO: 0.38 K/UL (ref 0–0.51)
EOSINOPHIL NFR BLD: 5.8 % (ref 0–6.9)
ERYTHROCYTE [DISTWIDTH] IN BLOOD BY AUTOMATED COUNT: 40.2 FL (ref 35.9–50)
EST. AVERAGE GLUCOSE BLD GHB EST-MCNC: 128 MG/DL
GLOBULIN SER CALC-MCNC: 3.4 G/DL (ref 1.9–3.5)
GLUCOSE SERPL-MCNC: 96 MG/DL (ref 65–99)
HBA1C MFR BLD: 6.1 % (ref 4–5.6)
HCT VFR BLD AUTO: 49.1 % (ref 42–52)
HDLC SERPL-MCNC: 28 MG/DL
HGB BLD-MCNC: 16.5 G/DL (ref 14–18)
IMM GRANULOCYTES # BLD AUTO: 0.13 K/UL (ref 0–0.11)
IMM GRANULOCYTES NFR BLD AUTO: 2 % (ref 0–0.9)
LDLC SERPL CALC-MCNC: 74 MG/DL
LYMPHOCYTES # BLD AUTO: 1.22 K/UL (ref 1–4.8)
LYMPHOCYTES NFR BLD: 18.5 % (ref 22–41)
MCH RBC QN AUTO: 29 PG (ref 27–33)
MCHC RBC AUTO-ENTMCNC: 33.6 G/DL (ref 33.7–35.3)
MCV RBC AUTO: 86.4 FL (ref 81.4–97.8)
MONOCYTES # BLD AUTO: 0.58 K/UL (ref 0–0.85)
MONOCYTES NFR BLD AUTO: 8.8 % (ref 0–13.4)
NEUTROPHILS # BLD AUTO: 4.19 K/UL (ref 1.82–7.42)
NEUTROPHILS NFR BLD: 63.4 % (ref 44–72)
NRBC # BLD AUTO: 0 K/UL
NRBC BLD-RTO: 0 /100 WBC
PLATELET # BLD AUTO: 217 K/UL (ref 164–446)
PMV BLD AUTO: 11.5 FL (ref 9–12.9)
POTASSIUM SERPL-SCNC: 4.2 MMOL/L (ref 3.6–5.5)
PROT SERPL-MCNC: 7.5 G/DL (ref 6–8.2)
RBC # BLD AUTO: 5.68 M/UL (ref 4.7–6.1)
SODIUM SERPL-SCNC: 139 MMOL/L (ref 135–145)
T4 FREE SERPL-MCNC: 1.01 NG/DL (ref 0.93–1.7)
TRIGL SERPL-MCNC: 346 MG/DL (ref 0–149)
TSH SERPL DL<=0.005 MIU/L-ACNC: 3.59 UIU/ML (ref 0.38–5.33)
WBC # BLD AUTO: 6.6 K/UL (ref 4.8–10.8)

## 2021-05-22 PROCEDURE — 80053 COMPREHEN METABOLIC PANEL: CPT

## 2021-05-22 PROCEDURE — 84439 ASSAY OF FREE THYROXINE: CPT

## 2021-05-22 PROCEDURE — 85025 COMPLETE CBC W/AUTO DIFF WBC: CPT

## 2021-05-22 PROCEDURE — 36415 COLL VENOUS BLD VENIPUNCTURE: CPT

## 2021-05-22 PROCEDURE — 80061 LIPID PANEL: CPT

## 2021-05-22 PROCEDURE — 83036 HEMOGLOBIN GLYCOSYLATED A1C: CPT

## 2021-05-22 PROCEDURE — 84443 ASSAY THYROID STIM HORMONE: CPT

## 2021-05-28 DIAGNOSIS — E03.8 OTHER SPECIFIED HYPOTHYROIDISM: ICD-10-CM

## 2021-05-28 DIAGNOSIS — E78.1 HYPERTRIGLYCERIDEMIA: ICD-10-CM

## 2021-05-31 RX ORDER — LEVOTHYROXINE SODIUM 0.07 MG/1
TABLET ORAL
Qty: 90 TABLET | Refills: 4 | Status: SHIPPED | OUTPATIENT
Start: 2021-05-31

## 2021-05-31 RX ORDER — ATORVASTATIN CALCIUM 20 MG/1
20 TABLET, FILM COATED ORAL DAILY
Qty: 90 TABLET | Refills: 4 | Status: SHIPPED | OUTPATIENT
Start: 2021-05-31

## 2021-10-12 ENCOUNTER — OFFICE VISIT (OUTPATIENT)
Dept: MEDICAL GROUP | Facility: PHYSICIAN GROUP | Age: 46
End: 2021-10-12
Payer: COMMERCIAL

## 2021-10-12 VITALS
TEMPERATURE: 97.7 F | SYSTOLIC BLOOD PRESSURE: 120 MMHG | HEIGHT: 68 IN | DIASTOLIC BLOOD PRESSURE: 68 MMHG | BODY MASS INDEX: 38.49 KG/M2 | OXYGEN SATURATION: 97 % | HEART RATE: 101 BPM | WEIGHT: 254 LBS

## 2021-10-12 DIAGNOSIS — R06.83 SNORING: ICD-10-CM

## 2021-10-12 DIAGNOSIS — L30.9 ECZEMA, UNSPECIFIED TYPE: ICD-10-CM

## 2021-10-12 DIAGNOSIS — G47.19 EXCESSIVE DAYTIME SLEEPINESS: ICD-10-CM

## 2021-10-12 DIAGNOSIS — U09.9 MULTIPLE PERSISTENT SYMPTOMS AFTER COVID-19: ICD-10-CM

## 2021-10-12 DIAGNOSIS — Z23 NEED FOR VACCINATION: ICD-10-CM

## 2021-10-12 PROCEDURE — 90686 IIV4 VACC NO PRSV 0.5 ML IM: CPT | Performed by: FAMILY MEDICINE

## 2021-10-12 PROCEDURE — 99214 OFFICE O/P EST MOD 30 MIN: CPT | Mod: 25 | Performed by: FAMILY MEDICINE

## 2021-10-12 PROCEDURE — 90471 IMMUNIZATION ADMIN: CPT | Performed by: FAMILY MEDICINE

## 2021-10-12 ASSESSMENT — FIBROSIS 4 INDEX: FIB4 SCORE: 0.92

## 2021-10-12 NOTE — PROGRESS NOTES
CC:Diagnoses of Need for vaccination, Excessive daytime sleepiness, Snoring, Eczema, unspecified type, and Multiple persistent symptoms after COVID-19 were pertinent to this visit.      HISTORY OF PRESENT ILLNESS: Patient is a 46 y.o. male established patient who presents today to follow-up on persistent Covid symptoms.  Patient was experiencing shortness of breath, cough, fevers and chills and anosmia approximately 5 weeks ago.  His son was experiencing the same symptoms at the same time as he was and his son tested positive for COVID-19.  Patient never was officially tested and diagnosed with COVID-19 and refusing testing today.  He continues to experience symptoms of shortness of breath and dyspnea on exertion that does not seem to be improving since the onset of his illness.  Patient is vaccinated with Pfizer back in April 2021 against COVID-19.    Continues to experience sleep apnea symptoms and never followed up with previous referral to sleep medicine    Continues to experience dry patches of skin and requesting referral to dermatology.    Patient Active Problem List    Diagnosis Date Noted   • Dyslipidemia (high LDL; low HDL) 07/12/2019   • Obesity (BMI 35.0-39.9 without comorbidity) 07/12/2019   • High risk heterosexual behavior 10/18/2018   • Erectile dysfunction 10/18/2018   • Hypersomnolence 02/17/2015   • Hypertriglyceridemia 06/17/2014   • Vitamin D deficiency disease 06/16/2014   • History of Hodgkin's disease 05/08/2014   • Hypothyroidism 05/08/2014   • Fatigue 05/08/2014      Allergies:Patient has no known allergies.    Current Outpatient Medications   Medication Sig Dispense Refill   • atorvastatin (LIPITOR) 20 MG Tab Take 1 tablet by mouth every day. 90 tablet 4   • levothyroxine (SYNTHROID) 75 MCG Tab TAKE 1 TABLET BY MOUTH IN THE MORNING ON AN EMPTY STOMACH 90 tablet 4   • sildenafil citrate (VIAGRA) 50 MG tablet Take 1 Tab by mouth as needed for Erectile Dysfunction. 30 Tab 3     No current  "facility-administered medications for this visit.       Social History     Tobacco Use   • Smoking status: Former Smoker     Packs/day: 1.50     Years: 7.00     Pack years: 10.50     Types: Cigarettes     Quit date: 5/8/2011     Years since quitting: 10.4   • Smokeless tobacco: Never Used   Substance Use Topics   • Alcohol use: No   • Drug use: No     Comment: hx drug use, no IV; mostly MJ     Social History     Social History Narrative   • Not on file       Family History   Problem Relation Age of Onset   • Other Father         etoh   • Alcohol/Drug Father    • Diabetes Father    • Lung Disease Father         smoker on O2   • Cancer Mother    • Cancer Paternal Grandmother         lung   • Stroke Paternal Grandfather        Review of Systems:    Constitutional: No Fevers, Chills  Eyes: No vision changes  MSK: No weakness  Skin: No rashes  Neuro: No Headaches  Psych: No Suicidal ideations    All remaining systems reviewed and found to be negative, except as stated above.    Exam:    /68 (BP Location: Right arm, Patient Position: Sitting, BP Cuff Size: Adult)   Pulse (!) 101   Temp 36.5 °C (97.7 °F) (Temporal)   Ht 1.727 m (5' 8\")   Wt 115 kg (254 lb)   SpO2 97%  Body mass index is 38.62 kg/m².    General:  Well nourished, well developed male in NAD  HENT: Atraumatic, normocephalic  EYES: Extraocular movements intact, pupils equal and reactive to light  NECK: Supple, FROM  CHEST: No deformities, Equal chest expansion  RESP: Unlabored, no stridor or audible wheeze  ABD: Non-Distended  Extremities: No Clubbing, Cyanosis, or Edema  Skin: Warm/dry, without rashes  Neuro: A/O x 4, CN 2-12 Grossly intact, Motor/sensory grossly intact  Psych: Normal behavior, normal affect      LABS: 5/22/2021: Results reviewed and discussed with the patient, questions answered.      Assessment/Plan:  1. Need for vaccination  - INFLUENZA VACCINE QUAD INJ (PF)    2. Excessive daytime sleepiness  3. Snoring  Chronic ongoing medical " condition.  New referral sent for sleep medicine and sleep study ordered as below.  - Polysomnography, 4 or More; Future  - REFERRAL TO PULMONARY AND SLEEP MEDICINE    4. Eczema, unspecified type  New problem to examiner.  Patient requesting referral to dermatology.  - REFERRAL TO DERMATOLOGY    5. Multiple persistent symptoms after COVID-19  New problem to examiner.  Counseled on cardiovascular fitness training follow-up if symptoms continue to worsen, or fail to improve.        Please note that this dictation was created using voice recognition software. I have worked with consultants from the vendor as well as technical experts from [employer] [employer] to optimize the interface. I have made every reasonable attempt to correct obvious errors, but I expect that there are errors of grammar and possibly content that I did not discover before finalizing the note.

## 2021-10-12 NOTE — PATIENT INSTRUCTIONS
SLEEP STUDY INSTRUCTIONS    1. Our main concern is to provide the best test and evaluation of your sleep and your cooperation in following the guidelines is very necessary.    2. We have no facilities for family members or guests at the sleep center. Special arrangements will be made for children requiring overnight sleep studies.    3. Unless otherwise instructed, AVOID alcoholic beverages on the day of your sleep study.    4. DO NOT drink coffee or caffeine-containing beverages after 12:00 noon on the day of your sleep study.    5. There is NO smoking at the sleep center.    6. Try to maintain a usual daytime schedule prior to the study (avoid unusual physical activity or meals).    7. DO NOT take a nap on the day of your study.    8. This is an outpatient procedure (test); therefore, nursing services, medications, and meals ARE NOT provided. If you take medications, bring them with you and take them on the schedule you do at home.    9. Please fill your sleep aid prescription (Ambien or Lunesta) and bring to your sleep study. Even patients who normally have no problem going to sleep often need a sleep aid in this different environment.    10. We ask that you wear conventional sleep attire (pajamas or sweats) for the sleep study. We discourage patients from wearing only their underwear to bed. We recommend two-piece pajamas as the techs will need to apply sensors to your stomach.    11. Please shampoo your hair the day of the sleep study. Please DO NOT use any other hair or skin products before your arrival (e.g., mousse, gel, hair or body spray, perfume, body lotion etc.) NOTE: Women should not wear heavy makeup prior to arrival as some wires are taped to the face area.    12. The technician will be applying several small electrodes to the scalp, eye area, chin, chest, and legs, plus respiratory effort belts around the chest. Also, there will be a device placed directly under the nose. (THIS WILL NOT OBSTRUCT  YOUR BREATHING.) This is a painless procedure and the skin is not broken.    13. The test is generally completed in six to eight hours; We are usually done between 6 - 7 a.m., unless you are scheduled for a nap study. You may need to come back another night for a second study to complete your treatment plan.    14. Patients who are scheduled for an MSLT (nap study) will stay at the sleep center for the day following their nighttime study. You will be notified if a nap study was ordered for you at the time the night study is scheduled. Generally, patients having a nap study will leave the sleep center by 4 p.m.    15. You will need to bring food for the following day if you are scheduled for a nap study. A refrigerator and microwave are available.    16. A bathroom is available for your use.    17. We are able to adjust the room temperature for your comfort. Please let the technologist know if you are uncomfortable during the study.    18. If you sleep better with a special pillow or stuffed animal, you may bring it along. Service animals are the only live animals permitted.    19. Cable T.V. is available.    20. You will be scheduled for a follow-up appointment three to five days after the sleep study to review your results.    21. A copy of your sleep study is sent to the referring physician approximately two weeks after your study.    22. Any questions can be directed to our staff at [phone].    23. If CPAP therapy is applied, a home unit will be ordered for you through the [employer] medical equipment company. You will be contacted to schedule delivery after insurance authorization.

## 2022-10-18 ENCOUNTER — OFFICE VISIT (OUTPATIENT)
Dept: URGENT CARE | Facility: PHYSICIAN GROUP | Age: 47
End: 2022-10-18

## 2022-10-18 VITALS
TEMPERATURE: 98.4 F | RESPIRATION RATE: 16 BRPM | OXYGEN SATURATION: 98 % | WEIGHT: 255 LBS | BODY MASS INDEX: 38.77 KG/M2 | DIASTOLIC BLOOD PRESSURE: 74 MMHG | SYSTOLIC BLOOD PRESSURE: 112 MMHG | HEART RATE: 94 BPM

## 2022-10-18 DIAGNOSIS — K62.9 RECTAL LESION: ICD-10-CM

## 2022-10-18 PROCEDURE — 99213 OFFICE O/P EST LOW 20 MIN: CPT | Performed by: PHYSICIAN ASSISTANT

## 2022-10-18 RX ORDER — SULFAMETHOXAZOLE AND TRIMETHOPRIM 800; 160 MG/1; MG/1
1 TABLET ORAL EVERY 12 HOURS
Qty: 20 TABLET | Refills: 0 | Status: SHIPPED | OUTPATIENT
Start: 2022-10-18 | End: 2022-10-28

## 2022-10-18 RX ORDER — KETOROLAC TROMETHAMINE 30 MG/ML
60 INJECTION, SOLUTION INTRAMUSCULAR; INTRAVENOUS ONCE
Status: DISCONTINUED | OUTPATIENT
Start: 2022-10-18 | End: 2022-10-18

## 2022-10-18 RX ORDER — KETOROLAC TROMETHAMINE 30 MG/ML
30 INJECTION, SOLUTION INTRAMUSCULAR; INTRAVENOUS ONCE
Status: COMPLETED | OUTPATIENT
Start: 2022-10-18 | End: 2022-10-18

## 2022-10-18 RX ORDER — HYDROCODONE BITARTRATE AND ACETAMINOPHEN 5; 325 MG/1; MG/1
1-2 TABLET ORAL EVERY 6 HOURS PRN
Qty: 8 TABLET | Refills: 0 | Status: SHIPPED | OUTPATIENT
Start: 2022-10-18 | End: 2022-10-21

## 2022-10-18 RX ADMIN — KETOROLAC TROMETHAMINE 30 MG: 30 INJECTION, SOLUTION INTRAMUSCULAR; INTRAVENOUS at 19:11

## 2022-10-18 RX ADMIN — KETOROLAC TROMETHAMINE 30 MG: 30 INJECTION, SOLUTION INTRAMUSCULAR; INTRAVENOUS at 19:12

## 2022-10-18 ASSESSMENT — ENCOUNTER SYMPTOMS
DIARRHEA: 0
CONSTIPATION: 0
BLOOD IN STOOL: 0
FEVER: 0
NAUSEA: 0
ABDOMINAL PAIN: 0
ROS GI COMMENTS: PERIRECTAL PAIN
VOMITING: 0
CHILLS: 0

## 2022-10-18 ASSESSMENT — FIBROSIS 4 INDEX: FIB4 SCORE: 0.94

## 2022-10-18 ASSESSMENT — PAIN SCALES - GENERAL: PAINLEVEL: 10=SEVERE PAIN

## 2022-10-19 NOTE — PROGRESS NOTES
Subjective:   Sammy May  is a 47 y.o. male who presents for Hemorrhoids (X1week )      Hemorrhoids  This is a new problem. The current episode started in the past 7 days. Pertinent negatives include no abdominal pain, chills, fever, nausea, rash or vomiting.     Patient presents urgent care noting 1 week of worsening pain perirectally.  Suspected an inflamed external hemorrhoid.  Patient has had a longstanding hemorrhoid in this area.  He notes an area of swelling on right side of rectum that is become increasingly painful over the last 48 hours.  He denies fevers chills nausea vomiting or any systemic symptoms.  He denies change in stool associated.  He feels a firm mass just adjacent to the rectum.  He denies past medical history of MRSA.  He does have a history of skin cysts and abscesses.    Review of Systems   Constitutional:  Negative for chills and fever.   Gastrointestinal:  Positive for hemorrhoids. Negative for abdominal pain, blood in stool, constipation, diarrhea, melena, nausea and vomiting.        Perirectal pain   Skin:  Negative for rash.     No Known Allergies     Objective:   /74   Pulse 94   Temp 36.9 °C (98.4 °F) (Temporal)   Resp 16   Wt 116 kg (255 lb)   SpO2 98%   BMI 38.77 kg/m²     Physical Exam  Vitals and nursing note reviewed.   Constitutional:       General: He is not in acute distress.     Appearance: Normal appearance. He is well-developed. He is obese. He is not diaphoretic.   HENT:      Head: Normocephalic and atraumatic.      Right Ear: External ear normal.      Left Ear: External ear normal.      Nose: Nose normal.   Eyes:      General: No scleral icterus.        Right eye: No discharge.         Left eye: No discharge.      Conjunctiva/sclera: Conjunctivae normal.   Pulmonary:      Effort: Pulmonary effort is normal. No respiratory distress.   Genitourinary:     Rectum: Tenderness present. No mass, anal fissure, external hemorrhoid or internal hemorrhoid.       Comments: Old appearing external hemorrhoid without inflammation or involvement, no obvious external hemorrhoid at area of concern, exquisite tenderness to palpation, no fluctuance, slight ulceration adjacent, possible deeper mass?,  Perirectal abscess?  Musculoskeletal:         General: Normal range of motion.      Cervical back: Neck supple.   Skin:     General: Skin is warm and dry.      Coloration: Skin is not pale.   Neurological:      Mental Status: He is alert and oriented to person, place, and time.      Coordination: Coordination normal.     Rocephin 1 g IM-tolerates well  Toradol 60 mg IM-tolerates well    Assessment/Plan:   1. Rectal lesion  - cefTRIAXone (Rocephin) 1 g, lidocaine (XYLOCAINE) 1 % 3.6 mL for IM use  - ketorolac (TORADOL) injection 60 mg  - sulfamethoxazole-trimethoprim (BACTRIM DS) 800-160 MG tablet; Take 1 Tablet by mouth every 12 hours for 10 days.  Dispense: 20 Tablet; Refill: 0  - HYDROcodone-acetaminophen (NORCO) 5-325 MG Tab per tablet; Take 1-2 Tablets by mouth every 6 hours as needed (pain) for up to 3 days.  Dispense: 8 Tablet; Refill: 0  - Referral to General Surgery    Other orders  - Consent for Opiate Prescription    Supportive care is reviewed with patient/caregiver - recommend to push PO fluids and electrolytes,  take full course of Rx, take with probiotics, observe for resolution  Unclear if deeper abscess or perirectal abscess?  Return to clinic with lack of resolution or progression of symptoms.  Cautioned regarding potential for sedation with medication.  Strict ER precautions with any worsening symptoms are reviewed with patient/caregiver and they do express understanding      I have worn an N95 mask, gloves and eye protection for the entire encounter with this patient.     Differential diagnosis, natural history, supportive care, and indications for immediate follow-up discussed.

## 2023-06-21 ENCOUNTER — NON-PROVIDER VISIT (OUTPATIENT)
Dept: URGENT CARE | Facility: PHYSICIAN GROUP | Age: 48
End: 2023-06-21

## 2023-06-21 DIAGNOSIS — Z02.1 PRE-EMPLOYMENT DRUG SCREENING: ICD-10-CM

## 2023-06-21 LAB
AMP AMPHETAMINE: NORMAL
COC COCAINE: NORMAL
INT CON NEG: NORMAL
INT CON POS: NORMAL
MET METHAMPHETAMINES: NORMAL
OPI OPIATES: NORMAL
PCP PHENCYCLIDINE: NORMAL
POC DRUG COMMENT 753798-OCCUPATIONAL HEALTH: NORMAL
THC: NORMAL

## 2023-06-21 PROCEDURE — 80305 DRUG TEST PRSMV DIR OPT OBS: CPT | Performed by: NURSE PRACTITIONER

## 2024-01-17 ENCOUNTER — OFFICE VISIT (OUTPATIENT)
Dept: URGENT CARE | Facility: PHYSICIAN GROUP | Age: 49
End: 2024-01-17
Payer: COMMERCIAL

## 2024-01-17 VITALS
SYSTOLIC BLOOD PRESSURE: 128 MMHG | RESPIRATION RATE: 16 BRPM | HEIGHT: 69 IN | BODY MASS INDEX: 35.4 KG/M2 | TEMPERATURE: 97.8 F | OXYGEN SATURATION: 98 % | DIASTOLIC BLOOD PRESSURE: 82 MMHG | HEART RATE: 110 BPM | WEIGHT: 239 LBS

## 2024-01-17 DIAGNOSIS — B96.89 ACUTE BACTERIAL SINUSITIS: ICD-10-CM

## 2024-01-17 DIAGNOSIS — J01.90 ACUTE BACTERIAL SINUSITIS: ICD-10-CM

## 2024-01-17 DIAGNOSIS — H66.003 NON-RECURRENT ACUTE SUPPURATIVE OTITIS MEDIA OF BOTH EARS WITHOUT SPONTANEOUS RUPTURE OF TYMPANIC MEMBRANES: ICD-10-CM

## 2024-01-17 DIAGNOSIS — J45.20 MILD INTERMITTENT ASTHMA WITHOUT COMPLICATION: ICD-10-CM

## 2024-01-17 PROCEDURE — 3074F SYST BP LT 130 MM HG: CPT | Performed by: PHYSICIAN ASSISTANT

## 2024-01-17 PROCEDURE — 3079F DIAST BP 80-89 MM HG: CPT | Performed by: PHYSICIAN ASSISTANT

## 2024-01-17 PROCEDURE — 99213 OFFICE O/P EST LOW 20 MIN: CPT | Performed by: PHYSICIAN ASSISTANT

## 2024-01-17 RX ORDER — AMOXICILLIN AND CLAVULANATE POTASSIUM 875; 125 MG/1; MG/1
1 TABLET, FILM COATED ORAL 2 TIMES DAILY
Qty: 14 TABLET | Refills: 0 | Status: SHIPPED | OUTPATIENT
Start: 2024-01-17 | End: 2024-01-24

## 2024-01-17 RX ORDER — DEXTROMETHORPHAN HYDROBROMIDE AND PROMETHAZINE HYDROCHLORIDE 15; 6.25 MG/5ML; MG/5ML
5 SYRUP ORAL EVERY 6 HOURS PRN
Qty: 118 ML | Refills: 0 | Status: SHIPPED | OUTPATIENT
Start: 2024-01-17 | End: 2024-01-24

## 2024-01-17 RX ORDER — PREDNISONE 10 MG/1
40 TABLET ORAL DAILY
Qty: 20 TABLET | Refills: 0 | Status: SHIPPED | OUTPATIENT
Start: 2024-01-17 | End: 2024-01-22

## 2024-01-17 RX ORDER — ALBUTEROL SULFATE 90 UG/1
2 AEROSOL, METERED RESPIRATORY (INHALATION) EVERY 6 HOURS PRN
Qty: 8.5 G | Refills: 0 | Status: SHIPPED | OUTPATIENT
Start: 2024-01-17

## 2024-01-17 ASSESSMENT — ENCOUNTER SYMPTOMS
EYE PAIN: 0
HEADACHES: 0
NAUSEA: 0
COUGH: 1
SHORTNESS OF BREATH: 1
FEVER: 0
CONSTIPATION: 0
VOMITING: 0
CHILLS: 0
DIARRHEA: 0
ABDOMINAL PAIN: 0
SORE THROAT: 1
SPUTUM PRODUCTION: 1
MYALGIAS: 0

## 2024-01-17 NOTE — PROGRESS NOTES
"Subjective:   Sammy May is a 48 y.o. male who presents for Cough (X 1 wk) and Shortness of Breath (X 1 wk)      Several sick coworkers recently.  Noted around 1 week ago onset of cough and tickle in throat.  The cough has been getting worse, with tighter chest and dyspnea.  Notes burning sensation in chest.  Has noted worse energy level and more fatigue the last 48 hours.  Cough has also been more productive. Trying to quit smoking.     Review of Systems   Constitutional:  Positive for malaise/fatigue. Negative for chills and fever.   HENT:  Positive for congestion and sore throat. Negative for ear pain.    Eyes:  Negative for pain.   Respiratory:  Positive for cough, sputum production and shortness of breath.    Cardiovascular:  Negative for chest pain.   Gastrointestinal:  Negative for abdominal pain, constipation, diarrhea, nausea and vomiting.   Genitourinary:  Negative for dysuria.   Musculoskeletal:  Negative for myalgias.   Skin:  Negative for rash.   Neurological:  Negative for headaches.       Medications, Allergies, and current problem list reviewed today in Epic.     Objective:     /82   Pulse (!) 110   Temp 36.6 °C (97.8 °F) (Temporal)   Resp 16   Ht 1.753 m (5' 9\")   Wt 108 kg (239 lb)   SpO2 98%     Physical Exam  Vitals reviewed.   Constitutional:       Appearance: Normal appearance.   HENT:      Head: Normocephalic and atraumatic.      Right Ear: Ear canal and external ear normal.      Left Ear: Ear canal and external ear normal.      Ears:      Comments: Red bulging tms bilat     Nose: Congestion and rhinorrhea present.      Mouth/Throat:      Mouth: Mucous membranes are moist.      Pharynx: Oropharynx is clear.   Eyes:      Conjunctiva/sclera: Conjunctivae normal.      Pupils: Pupils are equal, round, and reactive to light.   Cardiovascular:      Rate and Rhythm: Normal rate and regular rhythm.   Pulmonary:      Effort: Pulmonary effort is normal.      Breath sounds: Wheezing " present.   Musculoskeletal:      Cervical back: Normal range of motion.   Lymphadenopathy:      Cervical: No cervical adenopathy.   Skin:     General: Skin is warm and dry.      Capillary Refill: Capillary refill takes less than 2 seconds.   Neurological:      Mental Status: He is alert and oriented to person, place, and time.         Assessment/Plan:     Diagnosis and associated orders:     1. Acute bacterial sinusitis  amoxicillin-clavulanate (AUGMENTIN) 875-125 MG Tab    predniSONE (DELTASONE) 10 MG Tab    albuterol 108 (90 Base) MCG/ACT Aero Soln inhalation aerosol    promethazine-dextromethorphan (PROMETHAZINE-DM) 6.25-15 MG/5ML syrup      2. Non-recurrent acute suppurative otitis media of both ears without spontaneous rupture of tympanic membranes  amoxicillin-clavulanate (AUGMENTIN) 875-125 MG Tab    predniSONE (DELTASONE) 10 MG Tab    albuterol 108 (90 Base) MCG/ACT Aero Soln inhalation aerosol    promethazine-dextromethorphan (PROMETHAZINE-DM) 6.25-15 MG/5ML syrup      3. Mild intermittent asthma without complication  amoxicillin-clavulanate (AUGMENTIN) 875-125 MG Tab    predniSONE (DELTASONE) 10 MG Tab    albuterol 108 (90 Base) MCG/ACT Aero Soln inhalation aerosol    promethazine-dextromethorphan (PROMETHAZINE-DM) 6.25-15 MG/5ML syrup         Comments/MDM:     History and physical consistent with otitis media and sinus infection concomitantly and as well as a reactive airway or asthma type exacerbation.  Recommend above treatment.  Patient recommended to remain out of work for 1 to 3 days.  Discussed the sedating nature of the cough medicine and use carefully never while driving or work.  Recommend close follow-up if not showing improvement.  Patient with minimal wheezing in the lungs, low suspicion for pulmonary consolidation of bacterial nature.         Differential diagnosis, natural history, supportive care, and indications for immediate follow-up discussed.    Advised the patient to follow-up with  the primary care physician for recheck, reevaluation, and consideration of further management.    Please note that this dictation was created using voice recognition software. I have made a reasonable attempt to correct obvious errors, but I expect that there are errors of grammar and possibly content that I did not discover before finalizing the note.    This note was electronically signed by Ruel Gregory PA-C

## 2024-01-17 NOTE — LETTER
January 17, 2024    To Whom It May Concern:         This is confirmation that Sammy May attended his scheduled appointment with Ruel Gregory P.A.-C. on 1/17/24.  Please excuse patient from work or allow remote working for the next 24-72 hours to recover from illness.          If you have any questions please do not hesitate to call me at the phone number listed below.    Sincerely,    Ruel Gregory P.A.-C.  [phone]

## 2024-05-09 ENCOUNTER — OFFICE VISIT (OUTPATIENT)
Dept: URGENT CARE | Facility: PHYSICIAN GROUP | Age: 49
End: 2024-05-09
Payer: MEDICAID

## 2024-05-09 VITALS
HEIGHT: 69 IN | OXYGEN SATURATION: 97 % | WEIGHT: 243 LBS | TEMPERATURE: 98.8 F | SYSTOLIC BLOOD PRESSURE: 124 MMHG | RESPIRATION RATE: 14 BRPM | HEART RATE: 85 BPM | DIASTOLIC BLOOD PRESSURE: 70 MMHG | BODY MASS INDEX: 35.99 KG/M2

## 2024-05-09 DIAGNOSIS — J98.8 RTI (RESPIRATORY TRACT INFECTION): ICD-10-CM

## 2024-05-09 DIAGNOSIS — J20.9 ACUTE WHEEZY BRONCHITIS: ICD-10-CM

## 2024-05-09 PROCEDURE — 3074F SYST BP LT 130 MM HG: CPT | Performed by: FAMILY MEDICINE

## 2024-05-09 PROCEDURE — 99213 OFFICE O/P EST LOW 20 MIN: CPT | Performed by: FAMILY MEDICINE

## 2024-05-09 PROCEDURE — 3078F DIAST BP <80 MM HG: CPT | Performed by: FAMILY MEDICINE

## 2024-05-09 RX ORDER — GABAPENTIN 300 MG/1
300 CAPSULE ORAL
COMMUNITY
Start: 2024-03-09

## 2024-05-09 RX ORDER — DEXTROMETHORPHAN HYDROBROMIDE AND PROMETHAZINE HYDROCHLORIDE 15; 6.25 MG/5ML; MG/5ML
5 SYRUP ORAL EVERY 6 HOURS PRN
Qty: 120 ML | Refills: 0 | Status: SHIPPED | OUTPATIENT
Start: 2024-05-09

## 2024-05-09 RX ORDER — PREDNISONE 20 MG/1
40 TABLET ORAL EVERY MORNING
Qty: 6 TABLET | Refills: 0 | Status: SHIPPED | OUTPATIENT
Start: 2024-05-09 | End: 2024-05-12

## 2024-05-09 RX ORDER — BUDESONIDE AND FORMOTEROL FUMARATE DIHYDRATE 80; 4.5 UG/1; UG/1
2 AEROSOL RESPIRATORY (INHALATION) 2 TIMES DAILY
Qty: 1 EACH | Refills: 0 | Status: SHIPPED | OUTPATIENT
Start: 2024-05-09

## 2024-05-09 ASSESSMENT — ENCOUNTER SYMPTOMS: COUGH: 1

## 2024-05-09 NOTE — PROGRESS NOTES
Subjective     Sammy May is a 48 y.o. male who presents with Cough (X 3 days), Congestion (X 3 days), and Fatigue    - This is a very pleasant 48 y.o. who has come to the walk-in clinic today for approximately 3 days coughing some chest congestion and tickle in the lungs and coughing little bit of stuffy nose.  No nausea vomiting fevers chills no bloody sputum    Positive smoker      ALLERGIES:  Patient has no known allergies.     PMH:  Past Medical History:   Diagnosis Date    ED (erectile dysfunction)     Hodgkin disease (HCC)     age 21, treated at [facility]    Hyperlipidemia     Hypothyroidism         PSH:  Past Surgical History:   Procedure Laterality Date    LYMPH NODE EXCISION Right 1997    DENTAL EXTRACTION(S)      wisdom teeth       MEDS:    Current Outpatient Medications:     gabapentin (NEURONTIN) 300 MG Cap, Take 300 mg by mouth at bedtime., Disp: , Rfl:     budesonide-formoterol (SYMBICORT) 80-4.5 MCG/ACT Aerosol, Inhale 2 Puffs 2 times a day., Disp: 1 Each, Rfl: 0    predniSONE (DELTASONE) 20 MG Tab, Take 2 Tablets by mouth every morning for 3 days., Disp: 6 Tablet, Rfl: 0    promethazine-dextromethorphan (PROMETHAZINE-DM) 6.25-15 MG/5ML syrup, Take 5 mL by mouth every 6 hours as needed for Cough., Disp: 120 mL, Rfl: 0    albuterol 108 (90 Base) MCG/ACT Aero Soln inhalation aerosol, Inhale 2 Puffs every 6 hours as needed for Shortness of Breath., Disp: 8.5 g, Rfl: 0    atorvastatin (LIPITOR) 20 MG Tab, Take 1 tablet by mouth every day., Disp: 90 tablet, Rfl: 4    levothyroxine (SYNTHROID) 75 MCG Tab, TAKE 1 TABLET BY MOUTH IN THE MORNING ON AN EMPTY STOMACH, Disp: 90 tablet, Rfl: 4    sildenafil citrate (VIAGRA) 50 MG tablet, Take 1 Tab by mouth as needed for Erectile Dysfunction., Disp: 30 Tab, Rfl: 3    ** I have documented what I find to be significant in regards to past medical, social, family and surgical history  in my HPI or under PMH/PSH/FH review section, otherwise it is noncontributory **  "          HPI    Review of Systems   Constitutional:  Positive for malaise/fatigue.   HENT:  Positive for congestion.    Respiratory:  Positive for cough.    All other systems reviewed and are negative.             Objective     /70   Pulse 85   Temp 37.1 °C (98.8 °F) (Temporal)   Resp 14   Ht 1.753 m (5' 9\")   Wt 110 kg (243 lb)   SpO2 97%   BMI 35.88 kg/m²      Physical Exam  Vitals and nursing note reviewed.   Constitutional:       General: He is not in acute distress.     Appearance: Normal appearance. He is well-developed.   HENT:      Head: Normocephalic.      Mouth/Throat:      Mouth: Mucous membranes are moist.      Pharynx: Oropharynx is clear. No oropharyngeal exudate or posterior oropharyngeal erythema.   Cardiovascular:      Heart sounds: Normal heart sounds. No murmur heard.  Pulmonary:      Effort: Pulmonary effort is normal. No respiratory distress.      Breath sounds: Wheezing present. No rhonchi or rales.   Neurological:      Mental Status: He is alert.      Motor: No abnormal muscle tone.   Psychiatric:         Mood and Affect: Mood normal.         Behavior: Behavior normal.                             Assessment & Plan     1. RTI (respiratory tract infection)        2. Acute wheezy bronchitis  budesonide-formoterol (SYMBICORT) 80-4.5 MCG/ACT Aerosol    predniSONE (DELTASONE) 20 MG Tab    promethazine-dextromethorphan (PROMETHAZINE-DM) 6.25-15 MG/5ML syrup          - Dx, plan & d/c instructions discussed   - Rest, stay hydrated, OTC Flonase for 2 to 4 weeks      Follow up with your regular primary care providers office within a week to keep them updated and informed of this visit and for regular routine health maintenance check-ups. ER if not improving in 2-3 days or if feeling/getting worse. (If you do not have a primary care provider and need to schedule one you may call [facility] at [phone] to do this).        Patient left in stable condition         Discussed if any testing, labs " or imaging studies are obtained outside of the [facility] facility, it is their responsibility to contact the Urgent Care and let us know that it was done and get us the results so adequate follow up can be initiated    Pertinent prior lab work and/or imaging studies in Epic have been reviewed by me today on day of this visit and taken into account for my treatment and plan today    Pertinent PMH/PSH and/or chronic conditions and medications if any were reviewed today and taken into account for my treatment and plan today    Pertinent prior office visit notes in [facility] have been reviewed by me today on day of this visit.    Please note that this dictation may have been created using voice recognition software, if so I have made every reasonable attempt to correct obvious errors, but I expect that there are errors of grammar and possibly content that I did not discover before finalizing the note.

## 2024-05-14 ENCOUNTER — OFFICE VISIT (OUTPATIENT)
Dept: URGENT CARE | Facility: PHYSICIAN GROUP | Age: 49
End: 2024-05-14
Payer: MEDICAID

## 2024-05-14 ENCOUNTER — APPOINTMENT (OUTPATIENT)
Dept: RADIOLOGY | Facility: IMAGING CENTER | Age: 49
End: 2024-05-14
Payer: MEDICAID

## 2024-05-14 VITALS
HEIGHT: 69 IN | HEART RATE: 109 BPM | DIASTOLIC BLOOD PRESSURE: 88 MMHG | WEIGHT: 242 LBS | SYSTOLIC BLOOD PRESSURE: 128 MMHG | OXYGEN SATURATION: 97 % | RESPIRATION RATE: 14 BRPM | TEMPERATURE: 98.8 F | BODY MASS INDEX: 35.84 KG/M2

## 2024-05-14 DIAGNOSIS — R05.1 ACUTE COUGH: ICD-10-CM

## 2024-05-14 PROCEDURE — 71046 X-RAY EXAM CHEST 2 VIEWS: CPT | Mod: TC,FY

## 2024-05-14 PROCEDURE — 99213 OFFICE O/P EST LOW 20 MIN: CPT

## 2024-05-14 PROCEDURE — 3074F SYST BP LT 130 MM HG: CPT

## 2024-05-14 PROCEDURE — 3079F DIAST BP 80-89 MM HG: CPT

## 2024-05-14 RX ORDER — BENZONATATE 100 MG/1
100 CAPSULE ORAL 3 TIMES DAILY PRN
Qty: 30 CAPSULE | Refills: 0 | Status: SHIPPED | OUTPATIENT
Start: 2024-05-14

## 2024-05-14 ASSESSMENT — ENCOUNTER SYMPTOMS
COUGH: 1
SHORTNESS OF BREATH: 1
WHEEZING: 1
MYALGIAS: 1
FEVER: 0
HEMOPTYSIS: 0
SPUTUM PRODUCTION: 0

## 2024-05-14 NOTE — PROGRESS NOTES
Subjective:   Sammy May is a 48 y.o. male who presents for Follow-Up (Pt states he's been taking the cough medication and he's been getting body aches with it/), Cough, and Letter for School/Work      HPI: This is a 48-year-old male who presents today for a cough.  The patient has had 1 week of cough.  He reports that his cough is dry and nonproductive.  He reports associated shortness of breath and wheezing.  He was recently seen in urgent care on 5\9\24 and diagnosed with respiratory tract infection and acute wheezy bronchitis.  He was prescribed an albuterol inhaler, steroids, and cough syrup.  Patient reports feeling achy with taking cough syrup.  He denies any fevers.    Review of Systems   Constitutional:  Negative for fever.   Respiratory:  Positive for cough, shortness of breath and wheezing. Negative for hemoptysis and sputum production.    Musculoskeletal:  Positive for myalgias.       Medications:    Current Outpatient Medications on File Prior to Visit   Medication Sig Dispense Refill    gabapentin (NEURONTIN) 300 MG Cap Take 300 mg by mouth at bedtime.      budesonide-formoterol (SYMBICORT) 80-4.5 MCG/ACT Aerosol Inhale 2 Puffs 2 times a day. 1 Each 0    albuterol 108 (90 Base) MCG/ACT Aero Soln inhalation aerosol Inhale 2 Puffs every 6 hours as needed for Shortness of Breath. 8.5 g 0    atorvastatin (LIPITOR) 20 MG Tab Take 1 tablet by mouth every day. 90 tablet 4    levothyroxine (SYNTHROID) 75 MCG Tab TAKE 1 TABLET BY MOUTH IN THE MORNING ON AN EMPTY STOMACH 90 tablet 4    sildenafil citrate (VIAGRA) 50 MG tablet Take 1 Tab by mouth as needed for Erectile Dysfunction. 30 Tab 3    promethazine-dextromethorphan (PROMETHAZINE-DM) 6.25-15 MG/5ML syrup Take 5 mL by mouth every 6 hours as needed for Cough. (Patient not taking: Reported on 5/14/2024) 120 mL 0     No current facility-administered medications on file prior to visit.        Allergies:   Patient has no known allergies.    Problem  "List:   Patient Active Problem List   Diagnosis    History of Hodgkin's disease    Hypothyroidism    Fatigue    Vitamin D deficiency disease    Hypertriglyceridemia    Hypersomnolence    High risk heterosexual behavior    Erectile dysfunction    Dyslipidemia (high LDL; low HDL)    Obesity (BMI 35.0-39.9 without comorbidity)        Surgical History:  Past Surgical History:   Procedure Laterality Date    LYMPH NODE EXCISION Right     DENTAL EXTRACTION(S)      wisdom teeth       Past Social Hx:   Social History     Tobacco Use    Smoking status: Former     Current packs/day: 0.00     Average packs/day: 1.5 packs/day for 7.0 years (10.5 ttl pk-yrs)     Types: Cigarettes     Start date: 2004     Quit date: 2011     Years since quittin.0    Smokeless tobacco: Never   Substance Use Topics    Alcohol use: No    Drug use: No     Comment: hx drug use, no IV; mostly MJ          Problem list, medications, and allergies reviewed by myself today in Epic.     Objective:     /88   Pulse (!) 109   Temp 37.1 °C (98.8 °F) (Temporal)   Resp 14   Ht 1.753 m (5' 9\")   Wt 110 kg (242 lb)   SpO2 97%   BMI 35.74 kg/m²     Physical Exam  Vitals and nursing note reviewed.   Constitutional:       General: He is not in acute distress.     Appearance: Normal appearance. He is normal weight. He is not ill-appearing, toxic-appearing or diaphoretic.   HENT:      Head: Normocephalic and atraumatic.      Right Ear: Tympanic membrane, ear canal and external ear normal. There is no impacted cerumen.      Left Ear: Tympanic membrane, ear canal and external ear normal. There is no impacted cerumen.      Nose: Nose normal. No congestion or rhinorrhea.      Mouth/Throat:      Mouth: Mucous membranes are moist.      Pharynx: Oropharynx is clear. No oropharyngeal exudate or posterior oropharyngeal erythema.   Cardiovascular:      Rate and Rhythm: Normal rate and regular rhythm.      Pulses: Normal pulses.      Heart sounds: " Normal heart sounds. No murmur heard.     No friction rub. No gallop.   Pulmonary:      Effort: Pulmonary effort is normal. No respiratory distress.      Breath sounds: Normal breath sounds. No stridor. No wheezing, rhonchi or rales.   Chest:      Chest wall: No tenderness.   Musculoskeletal:      Cervical back: Normal range of motion and neck supple. No tenderness.   Lymphadenopathy:      Cervical: No cervical adenopathy.   Skin:     General: Skin is warm and dry.      Capillary Refill: Capillary refill takes less than 2 seconds.   Neurological:      General: No focal deficit present.      Mental Status: He is alert and oriented to person, place, and time. Mental status is at baseline.      Cranial Nerves: No cranial nerve deficit.      Motor: No weakness.      Gait: Gait normal.   Psychiatric:         Mood and Affect: Mood normal.         Behavior: Behavior normal.         Thought Content: Thought content normal.         Judgment: Judgment normal.         Assessment/Plan:     Diagnosis and associated orders:   1. Acute cough  DX-CHEST-2 VIEWS    benzonatate (TESSALON) 100 MG Cap    Referral to establish with PCP             Comments/MDM:   Pt is clinically stable at today's acute urgent care visit.  No acute distress noted. Appropriate for outpatient management at this time.     Acute problem.  The patient presents today for cough and shortness of breath.  He was recently seen last week for the same.  He reports feeling achy with taking previously prescribed cough syrup.  2 view chest x-ray was obtained today and is negative.  The patient will be prescribed Tessalon.  Advised him to begin taking the Tessalon as prescribed and discontinue use of previously prescribed cough syrup.  The patient is requesting a refill on medication for thyroid.  He reports intermittently taking this medication over the last few years.  He does not have routine PCP.  A referral will be placed today to establish.  Advised patient to  follow-up with establishing a PCP for chronic medication management.  The patient is to return for any new or worsening signs or symptoms or worsens or fail to improve.  He is agreeable to care verbalizes good understanding.           Discussed DDx, management options (risks,benefits, and alternatives to planned treatment), natural progression and supportive care.  Expressed understanding and the treatment plan was agreed upon. Questions were encouraged and answered   Return to urgent care prn if new or worsening sx or if there is no improvement in condition prn.    Educated in Red flags and indications to immediately call 911 or present to the Emergency Department.   Advised the patient to follow-up with the primary care physician for recheck, reevaluation, and consideration of further management.    I personally reviewed prior external notes and test results pertinent to today's visit.  I have independently reviewed and interpreted all diagnostics ordered during this urgent care acute visit.         Please note that this dictation was created using voice recognition software. I have made a reasonable attempt to correct obvious errors, but I expect that there are errors of grammar and possibly content that I did not discover before finalizing the note.    This note was electronically signed by CARMEN Blake

## 2024-05-14 NOTE — LETTER
May 14, 2024    To Whom It May Concern:         This is confirmation that Sammy May attended his scheduled appointment with PRISCILLA Ryan on 5/14/24. He is cleared to return back to work next week as scheduled.         If you have any questions please do not hesitate to call me at the phone number listed below.    Sincerely,          ADOLFO Ryan.  [phone]

## 2024-11-19 ENCOUNTER — OFFICE VISIT (OUTPATIENT)
Dept: MEDICAL GROUP | Facility: PHYSICIAN GROUP | Age: 49
End: 2024-11-19
Payer: COMMERCIAL

## 2024-11-19 VITALS
WEIGHT: 238.2 LBS | HEART RATE: 90 BPM | OXYGEN SATURATION: 96 % | DIASTOLIC BLOOD PRESSURE: 74 MMHG | SYSTOLIC BLOOD PRESSURE: 116 MMHG | TEMPERATURE: 97.6 F | BODY MASS INDEX: 35.28 KG/M2 | HEIGHT: 69 IN

## 2024-11-19 DIAGNOSIS — Z23 NEED FOR VACCINATION: ICD-10-CM

## 2024-11-19 DIAGNOSIS — E66.9 OBESITY (BMI 35.0-39.9 WITHOUT COMORBIDITY): ICD-10-CM

## 2024-11-19 DIAGNOSIS — B35.1 ONYCHOMYCOSIS OF GREAT TOE: ICD-10-CM

## 2024-11-19 DIAGNOSIS — E03.8 OTHER SPECIFIED HYPOTHYROIDISM: ICD-10-CM

## 2024-11-19 DIAGNOSIS — Z00.00 HEALTHCARE MAINTENANCE: ICD-10-CM

## 2024-11-19 DIAGNOSIS — E78.5 DYSLIPIDEMIA (HIGH LDL; LOW HDL): ICD-10-CM

## 2024-11-19 DIAGNOSIS — G62.9 NEUROPATHY: ICD-10-CM

## 2024-11-19 DIAGNOSIS — Z85.71 HISTORY OF HODGKIN'S DISEASE: ICD-10-CM

## 2024-11-19 DIAGNOSIS — Z11.59 NEED FOR HEPATITIS C SCREENING TEST: ICD-10-CM

## 2024-11-19 DIAGNOSIS — R73.03 PREDIABETES: ICD-10-CM

## 2024-11-19 DIAGNOSIS — E78.1 HYPERTRIGLYCERIDEMIA: ICD-10-CM

## 2024-11-19 PROCEDURE — 90656 IIV3 VACC NO PRSV 0.5 ML IM: CPT

## 2024-11-19 PROCEDURE — 3078F DIAST BP <80 MM HG: CPT

## 2024-11-19 PROCEDURE — 99214 OFFICE O/P EST MOD 30 MIN: CPT | Mod: 25

## 2024-11-19 PROCEDURE — 3074F SYST BP LT 130 MM HG: CPT

## 2024-11-19 PROCEDURE — 90471 IMMUNIZATION ADMIN: CPT

## 2024-11-19 RX ORDER — LEVOTHYROXINE SODIUM 75 UG/1
TABLET ORAL
Qty: 90 TABLET | Refills: 3 | Status: SHIPPED | OUTPATIENT
Start: 2024-11-19

## 2024-11-19 RX ORDER — TERBINAFINE HYDROCHLORIDE 250 MG/1
250 TABLET ORAL DAILY
Qty: 90 TABLET | Refills: 0 | Status: SHIPPED | OUTPATIENT
Start: 2024-11-19

## 2024-11-19 RX ORDER — ATORVASTATIN CALCIUM 20 MG/1
20 TABLET, FILM COATED ORAL DAILY
Qty: 90 TABLET | Refills: 3 | Status: SHIPPED | OUTPATIENT
Start: 2024-11-19

## 2024-11-19 RX ORDER — GABAPENTIN 300 MG/1
300 CAPSULE ORAL
Qty: 90 CAPSULE | Refills: 3 | Status: SHIPPED | OUTPATIENT
Start: 2024-11-19

## 2024-11-19 ASSESSMENT — ENCOUNTER SYMPTOMS
NAUSEA: 0
ABDOMINAL PAIN: 0
VOMITING: 0
WEIGHT LOSS: 0
COUGH: 0
BLURRED VISION: 0
FEVER: 0
MYALGIAS: 0
HEADACHES: 0
CONSTIPATION: 0
SHORTNESS OF BREATH: 0
WEAKNESS: 0
CHILLS: 0
DIARRHEA: 0
DIZZINESS: 0

## 2024-11-19 ASSESSMENT — PATIENT HEALTH QUESTIONNAIRE - PHQ9: CLINICAL INTERPRETATION OF PHQ2 SCORE: 0

## 2024-11-20 NOTE — PROGRESS NOTES
Verbal consent was acquired by the patient to use [employer] ambient listening note generation during this visit  Subjective:     CC:  Diagnoses of Other specified hypothyroidism, Dyslipidemia (high LDL; low HDL), Obesity (BMI 35.0-39.9 without comorbidity), History of Hodgkin's disease, Prediabetes, Healthcare maintenance, Need for hepatitis C screening test, Need for vaccination, Hypertriglyceridemia, Neuropathy, and Onychomycosis of great toe were pertinent to this visit.    HISTORY OF THE PRESENT ILLNESS: Patient is a 49 y.o. male. This pleasant patient is here today to establish care and discuss the following problems:  History of Present Illness  The patient is a 49-year-old male who presents for evaluation of multiple medical concerns.    He has a history of high cholesterol and is currently on atorvastatin 20 mg daily. He also has erectile dysfunction, previously managed with sildenafil, but he has since discontinued this medication. He reports fatigue and a history of stage IV Hodgkin's disease, diagnosed at age 21. He is also on levothyroxine 75 mcg daily for hypothyroidism, but has been off this medication for 1.5 months due to a lack of prescription refills. He experiences difficulty focusing and extreme tiredness when not on levothyroxine.    He is also on gabapentin 300 mg at night for neuropathy, which he describes as excruciating nerve pain in his legs. He attributes this pain to his work schedule, which involves being on his feet for 13 hours a day, six days a week. He also believes his balance is affected by his age and previous chemotherapy and radiation treatments.    He has a history of wisdom teeth extraction and lymph node excision. He has never vaped, but occasionally uses tobacco and marijuana. He does not consume alcohol. He has a history of prediabetes and has had pneumonia at least once. He has lost 15 pounds in the last three months.    He has been dealing with a fungal infection under  "his toenail for the past two years. Despite wearing steel toe boots for 15 hours a day, he reports that the condition is worsening, with bleeding through his toenails. The infection is more severe on one foot than the other. He has been managing the pain with ibuprofen.    SOCIAL HISTORY  He works in mining.    FAMILY HISTORY  His mother  of brain cancer. His father  of stroke and had COPD. He was a smoker.    No problems updated.      ROS:   Review of Systems   Constitutional:  Negative for chills, fever, malaise/fatigue and weight loss.   Eyes:  Negative for blurred vision.   Respiratory:  Negative for cough and shortness of breath.    Cardiovascular:  Negative for chest pain.   Gastrointestinal:  Negative for abdominal pain, constipation, diarrhea, nausea and vomiting.   Musculoskeletal:  Negative for myalgias.   Neurological:  Negative for dizziness, weakness and headaches.         Objective:     Exam: /74 (BP Location: Left arm, Patient Position: Sitting, BP Cuff Size: Adult)   Pulse 90   Temp 36.4 °C (97.6 °F) (Temporal)   Ht 1.753 m (5' 9\")   Wt 108 kg (238 lb 3.2 oz)   SpO2 96%  Body mass index is 35.18 kg/m².    Physical Exam  Constitutional:       Appearance: Normal appearance.   HENT:      Head: Normocephalic.   Eyes:      Conjunctiva/sclera: Conjunctivae normal.      Pupils: Pupils are equal, round, and reactive to light.   Cardiovascular:      Rate and Rhythm: Normal rate and regular rhythm.      Heart sounds: No murmur heard.  Pulmonary:      Effort: Pulmonary effort is normal. No respiratory distress.      Breath sounds: Normal breath sounds.   Musculoskeletal:         General: Normal range of motion.   Feet:      Right foot:      Toenail Condition: Fungal disease present.     Left foot:      Toenail Condition: Fungal disease present.  Skin:     General: Skin is warm and dry.   Neurological:      General: No focal deficit present.      Mental Status: He is alert and oriented to " person, place, and time.   Psychiatric:         Mood and Affect: Mood normal.         Behavior: Behavior normal.           Labs:     Assessment & Plan: Medical Decision Making   49 y.o. male with the following -    Assessment & Plan  1. Hyperlipidemia.  He is currently on atorvastatin 20 mg daily. Cholesterol levels will be checked. Atorvastatin will be continued.    2. Erectile Dysfunction.  He is no longer taking sildenafil.    3. Hypothyroidism.  He has been off levothyroxine 75 mcg daily for about a month and a half. He reports significant fatigue and poor focus when not on the medication. Levothyroxine 75 mcg will be resumed. Thyroid levels will be checked 6 weeks after resuming the medication.    4. Neuropathy.  He experiences excruciating nerve pain, primarily in his legs, likely due to his work schedule and past chemotherapy and radiation. He is currently taking gabapentin 300 mg at nighttime for neuropathy.    5. onychomycosis  He has a fungal infection under his toenails, causing significant pain and bleeding. Terbinafine will be prescribed, to be taken once daily for 3 months. A liver function test will be ordered, to be conducted approximately 6 weeks after starting terbinafine.    6. Prediabetes.  His A1c was 6.1% in 2021. A repeat A1c test will be ordered to monitor his prediabetes status.    7. Health Maintenance.  His CBC and metabolic panel from 2021 were within normal limits. A CBC will be ordered to check his immune system status. Influenza and pneumonia vaccines will be administered today. A Cologuard kit was offered but declined.    8. Obesity  -Exercise: At least 150 minutes of moderate aerobic activity per week or 75 minutes of vigorous aerobic activity per week, +2 days/week of strength training  - Healthy lifestyle and eating habits: Mediterranean-based diet (rich in fruits, vegetables, nuts and healthy oils), proper hydration and avoiding sugary beverages, adequate sleep hygiene-(allowing 7  to 8 hours of overnight sleep).          Problem List Items Addressed This Visit       History of Hodgkin's disease    Relevant Orders    CBC WITH DIFFERENTIAL    Comp Metabolic Panel    Hypothyroidism    Relevant Medications    levothyroxine (SYNTHROID) 75 MCG Tab    Other Relevant Orders    TSH+FREE T4    Dyslipidemia (high LDL; low HDL)    Relevant Medications    atorvastatin (LIPITOR) 20 MG Tab    Other Relevant Orders    Comp Metabolic Panel    Lipid Profile    Obesity (BMI 35.0-39.9 without comorbidity)    Relevant Orders    CBC WITH DIFFERENTIAL    Comp Metabolic Panel    Lipid Profile     Other Visit Diagnoses       Prediabetes        Relevant Orders    Comp Metabolic Panel    HEMOGLOBIN A1C    Healthcare maintenance        Relevant Orders    CBC WITH DIFFERENTIAL    Comp Metabolic Panel    Lipid Profile    HEMOGLOBIN A1C    TSH+FREE T4    Need for hepatitis C screening test        Relevant Orders    HEP C VIRUS ANTIBODY    Need for vaccination        Relevant Orders    INFLUENZA VACCINE TRI INJ (PF)     Pneumococcal Conjugate Vaccine 20-Valent (6 wks+)    Hypertriglyceridemia        Relevant Medications    atorvastatin (LIPITOR) 20 MG Tab    Neuropathy        Relevant Medications    gabapentin (NEURONTIN) 300 MG Cap    Onychomycosis of great toe        Relevant Medications    terbinafine (LAMISIL) 250 MG Tab            Differential diagnosis, natural history, supportive care, and indications for immediate follow-up discussed.  Shared decision making approach was utilized, and patient is amendable with plan of care.  Patient understands to return to clinic or go to the emergency department if symptoms worsen. All questions and concerns addressed to the best of my knowledge.      Return in about 1 year (around 11/19/2025), or if symptoms worsen or fail to improve.    Please note that this dictation was created using voice recognition software. I have made every reasonable attempt to correct obvious errors,  but I expect that there are errors of grammar and possibly content that I did not discover before finalizing the note.

## 2025-02-22 ENCOUNTER — OFFICE VISIT (OUTPATIENT)
Dept: URGENT CARE | Facility: CLINIC | Age: 50
End: 2025-02-22
Payer: COMMERCIAL

## 2025-02-22 VITALS
HEART RATE: 101 BPM | WEIGHT: 243.6 LBS | HEIGHT: 69 IN | SYSTOLIC BLOOD PRESSURE: 120 MMHG | DIASTOLIC BLOOD PRESSURE: 70 MMHG | TEMPERATURE: 97.5 F | OXYGEN SATURATION: 95 % | RESPIRATION RATE: 15 BRPM | BODY MASS INDEX: 36.08 KG/M2

## 2025-02-22 DIAGNOSIS — J22 LOWER RESPIRATORY INFECTION: ICD-10-CM

## 2025-02-22 DIAGNOSIS — R01.1 HOLOSYSTOLIC MURMUR: ICD-10-CM

## 2025-02-22 PROCEDURE — 99213 OFFICE O/P EST LOW 20 MIN: CPT | Performed by: FAMILY MEDICINE

## 2025-02-22 PROCEDURE — 3078F DIAST BP <80 MM HG: CPT | Performed by: FAMILY MEDICINE

## 2025-02-22 PROCEDURE — 3074F SYST BP LT 130 MM HG: CPT | Performed by: FAMILY MEDICINE

## 2025-02-22 RX ORDER — DOXYCYCLINE HYCLATE 100 MG
100 TABLET ORAL 2 TIMES DAILY
Qty: 14 TABLET | Refills: 0 | Status: SHIPPED | OUTPATIENT
Start: 2025-02-22

## 2025-02-22 RX ORDER — DEXTROMETHORPHAN HYDROBROMIDE AND PROMETHAZINE HYDROCHLORIDE 15; 6.25 MG/5ML; MG/5ML
5 SYRUP ORAL EVERY EVENING
Qty: 118 ML | Refills: 0 | Status: SHIPPED | OUTPATIENT
Start: 2025-02-22

## 2025-02-22 ASSESSMENT — ENCOUNTER SYMPTOMS
COUGH: 1
FEVER: 1
SHORTNESS OF BREATH: 1

## 2025-02-22 NOTE — PROGRESS NOTES
Subjective:     Sammy May is a 49 y.o. male who presents for Cough (Worse at night, has been ongoing for the last two weeks, hacking up green stuff, fever for 3 days)    HPI  Pt presents for evaluation of an acute problem  Patient with any illness for the past 2 weeks  Started with cough and nasal congestion  Has had productive cough with green sputum  Having new fever the last 3 days  Cough is much worse at night   Having difficulties sleeping     Review of Systems   Constitutional:  Positive for fever and malaise/fatigue.   HENT:  Positive for congestion.    Respiratory:  Positive for cough and shortness of breath.        PMH:  has a past medical history of ED (erectile dysfunction), Hodgkin disease (HCC), Hyperlipidemia, and Hypothyroidism.  MEDS:   Current Outpatient Medications:     doxycycline (VIBRAMYCIN) 100 MG Tab, Take 1 Tablet by mouth 2 times a day., Disp: 14 Tablet, Rfl: 0    promethazine-dextromethorphan (PROMETHAZINE-DM) 6.25-15 MG/5ML syrup, Take 5 mL by mouth every evening., Disp: 118 mL, Rfl: 0    atorvastatin (LIPITOR) 20 MG Tab, Take 1 Tablet by mouth every day., Disp: 90 Tablet, Rfl: 3    gabapentin (NEURONTIN) 300 MG Cap, Take 1 Capsule by mouth at bedtime., Disp: 90 Capsule, Rfl: 3    levothyroxine (SYNTHROID) 75 MCG Tab, TAKE 1 TABLET BY MOUTH IN THE MORNING ON AN EMPTY STOMACH, Disp: 90 Tablet, Rfl: 3    terbinafine (LAMISIL) 250 MG Tab, Take 1 Tablet by mouth every day., Disp: 90 Tablet, Rfl: 0  ALLERGIES: No Known Allergies  SURGHX:   Past Surgical History:   Procedure Laterality Date    LYMPH NODE EXCISION Right 1997    DENTAL EXTRACTION(S)      wisdom teeth     SOCHX:  reports that he quit smoking about 13 years ago. His smoking use included cigarettes. He started smoking about 20 years ago. He has a 10.5 pack-year smoking history. He has never used smokeless tobacco. He reports current drug use. Drug: Marijuana. He reports that he does not drink alcohol.     Objective:   /70  "(BP Location: Left arm, Patient Position: Sitting, BP Cuff Size: Adult)   Pulse (!) 101   Temp 36.4 °C (97.5 °F) (Temporal)   Resp 15   Ht 1.753 m (5' 9\")   Wt 110 kg (243 lb 9.6 oz)   SpO2 95%   BMI 35.97 kg/m²     Physical Exam  Constitutional:       General: He is not in acute distress.     Appearance: He is well-developed. He is not diaphoretic.   HENT:      Head: Normocephalic and atraumatic.      Right Ear: Tympanic membrane, ear canal and external ear normal.      Left Ear: Tympanic membrane, ear canal and external ear normal.      Nose: Nose normal.      Mouth/Throat:      Mouth: Mucous membranes are moist.      Pharynx: Oropharynx is clear. No oropharyngeal exudate or posterior oropharyngeal erythema.   Neck:      Trachea: No tracheal deviation.   Cardiovascular:      Comments: Heart regular rate and rhythm, 4/6 holosystolic murmur present  Pulmonary:      Effort: Pulmonary effort is normal. No respiratory distress.      Breath sounds: Normal breath sounds. No wheezing or rales.   Musculoskeletal:         General: Normal range of motion.      Cervical back: Normal range of motion and neck supple. No tenderness.   Lymphadenopathy:      Cervical: No cervical adenopathy.   Skin:     General: Skin is warm and dry.      Findings: No rash.   Neurological:      Mental Status: He is alert.   Psychiatric:         Behavior: Behavior normal.         Thought Content: Thought content normal.         Judgment: Judgment normal.       Assessment/Plan:   Assessment    1. Lower respiratory infection  - doxycycline (VIBRAMYCIN) 100 MG Tab; Take 1 Tablet by mouth 2 times a day.  Dispense: 14 Tablet; Refill: 0  - promethazine-dextromethorphan (PROMETHAZINE-DM) 6.25-15 MG/5ML syrup; Take 5 mL by mouth every evening.  Dispense: 118 mL; Refill: 0    2. Holosystolic murmur  - REFERRAL TO CARDIOLOGY    Patient with lower respiratory infection.  Has been ill for over 2 weeks, actively worsening, and with new fevers at home.  " Recommended treatment with doxycycline, stronger nighttime cough medication, and reviewed other supportive care measures.      Incidentally, he was noticed to have a loud holosystolic murmur.  He states that other physicians have mentioned this over the past 2 years, but has never been recommended to do anything about it.  Has not had an echocardiogram before.  Did recommend further evaluation of this.  Initially recommended patient discuss this with PCP and possibly get echocardiogram.  Patient prefers to be referred to cardiologist to further discuss.

## 2025-03-12 ENCOUNTER — NON-PROVIDER VISIT (OUTPATIENT)
Dept: URGENT CARE | Facility: PHYSICIAN GROUP | Age: 50
End: 2025-03-12

## 2025-03-12 DIAGNOSIS — Z02.89 ENCOUNTER FOR PHYSICAL EXAMINATION RELATED TO EMPLOYMENT: ICD-10-CM

## 2025-03-12 PROCEDURE — 90746 HEPB VACCINE 3 DOSE ADULT IM: CPT | Performed by: PHYSICIAN ASSISTANT

## 2025-03-12 NOTE — PROGRESS NOTES
"Sammy May is a 49 y.o. male here for a non-provider visit for:   HEPATITIS B 1 of 3    Reason for immunization: needed for work/school  Immunization records indicate need for vaccine: No  Minimum interval has been met for this vaccine: No  ABN completed: Yes    VIS Dated  10/15/2021 was given to patient: Yes  All IAC Questionnaire questions were answered \"No.\"    Patient tolerated injection and no adverse effects were observed or reported: Yes    Pt scheduled for next dose in series: Not Indicated  "

## 2025-03-14 ENCOUNTER — TELEPHONE (OUTPATIENT)
Dept: HEALTH INFORMATION MANAGEMENT | Facility: OTHER | Age: 50
End: 2025-03-14
Payer: COMMERCIAL

## 2025-06-14 ENCOUNTER — OFFICE VISIT (OUTPATIENT)
Dept: URGENT CARE | Facility: PHYSICIAN GROUP | Age: 50
End: 2025-06-14
Payer: COMMERCIAL

## 2025-06-14 ENCOUNTER — APPOINTMENT (OUTPATIENT)
Dept: RADIOLOGY | Facility: IMAGING CENTER | Age: 50
End: 2025-06-14
Attending: FAMILY MEDICINE
Payer: COMMERCIAL

## 2025-06-14 VITALS
HEIGHT: 69 IN | RESPIRATION RATE: 16 BRPM | WEIGHT: 226 LBS | SYSTOLIC BLOOD PRESSURE: 128 MMHG | TEMPERATURE: 97.4 F | BODY MASS INDEX: 33.47 KG/M2 | OXYGEN SATURATION: 97 % | HEART RATE: 74 BPM | DIASTOLIC BLOOD PRESSURE: 72 MMHG

## 2025-06-14 DIAGNOSIS — S43.402A SPRAIN OF LEFT SHOULDER, UNSPECIFIED SHOULDER SPRAIN TYPE, INITIAL ENCOUNTER: Primary | ICD-10-CM

## 2025-06-14 DIAGNOSIS — M62.838 MUSCLE SPASM OF LEFT SHOULDER: ICD-10-CM

## 2025-06-14 DIAGNOSIS — S43.402A SPRAIN OF LEFT SHOULDER, UNSPECIFIED SHOULDER SPRAIN TYPE, INITIAL ENCOUNTER: ICD-10-CM

## 2025-06-14 PROCEDURE — 3074F SYST BP LT 130 MM HG: CPT | Performed by: FAMILY MEDICINE

## 2025-06-14 PROCEDURE — 3078F DIAST BP <80 MM HG: CPT | Performed by: FAMILY MEDICINE

## 2025-06-14 PROCEDURE — 73000 X-RAY EXAM OF COLLAR BONE: CPT | Mod: TC,FY,LT | Performed by: RADIOLOGY

## 2025-06-14 PROCEDURE — 73030 X-RAY EXAM OF SHOULDER: CPT | Mod: TC,FY,LT | Performed by: RADIOLOGY

## 2025-06-14 PROCEDURE — 99214 OFFICE O/P EST MOD 30 MIN: CPT | Performed by: FAMILY MEDICINE

## 2025-06-14 RX ORDER — NAPROXEN 500 MG/1
500 TABLET ORAL 2 TIMES DAILY WITH MEALS
Qty: 20 TABLET | Refills: 0 | Status: SHIPPED | OUTPATIENT
Start: 2025-06-14 | End: 2025-06-24

## 2025-06-14 RX ORDER — CYCLOBENZAPRINE HCL 5 MG
10 TABLET ORAL NIGHTLY PRN
Qty: 10 TABLET | Refills: 0 | Status: SHIPPED | OUTPATIENT
Start: 2025-06-14

## 2025-06-14 ASSESSMENT — ENCOUNTER SYMPTOMS
CHILLS: 0
NAUSEA: 0
SENSORY CHANGE: 0
VOMITING: 0
FEVER: 0
MYALGIAS: 0
FOCAL WEAKNESS: 0
SORE THROAT: 0
SHORTNESS OF BREATH: 0
TINGLING: 0
NUMBNESS: 0
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
